# Patient Record
Sex: FEMALE | Race: BLACK OR AFRICAN AMERICAN | NOT HISPANIC OR LATINO | URBAN - METROPOLITAN AREA
[De-identification: names, ages, dates, MRNs, and addresses within clinical notes are randomized per-mention and may not be internally consistent; named-entity substitution may affect disease eponyms.]

---

## 2022-08-17 LAB
EXTERNAL HEPATITIS B SURFACE ANTIGEN: NON REACTIVE
EXTERNAL HIV-1/2 AB-AG: NORMAL
EXTERNAL RUBELLA IGG QUANTITATION: NORMAL
EXTERNAL SYPHILIS TOTAL IGG/IGM SCREENING: NON REACTIVE

## 2023-01-30 ENCOUNTER — OFFICE VISIT (OUTPATIENT)
Dept: OBGYN CLINIC | Age: 33
End: 2023-01-30

## 2023-01-30 VITALS
BODY MASS INDEX: 33.66 KG/M2 | WEIGHT: 202 LBS | SYSTOLIC BLOOD PRESSURE: 124 MMHG | DIASTOLIC BLOOD PRESSURE: 72 MMHG | HEIGHT: 65 IN

## 2023-01-30 DIAGNOSIS — Z34.93 THIRD TRIMESTER PREGNANCY: Primary | ICD-10-CM

## 2023-01-30 NOTE — PROGRESS NOTES
Pt is currently 31wks 5days pregnant   Pt is here to establish OB care with our practice, transfer from [place]   Pt does not have records/did fill out medical records request   No concern at this time    ELKIN 3/29   No LOF,VB  Weimar Miller Contractions   +FM   Blood type O +    No complications w/ prior pregnancy/deliverys

## 2023-01-30 NOTE — PROGRESS NOTES
Assessment/Plan:       Problem List Items Addressed This Visit    None  Visit Diagnoses     Third trimester pregnancy    -  Primary          12GF [id number] at 31 weeks 5 days based on LMP c/w early ultrasound     - Will need full review of patient records to ensure all prenatal labs collected, as well as normal 3hr gtt  - Prenatal care, practice, delivering hospital reviewed    RTO 2 weeks    Subjective:      Patient ID: Steven Fitzgerald is a 27 y o  female  HPI  30yo [id number] at 31w5d by patient stated ELKIN, presenting to [facility] care  No records available for review from care for this pregnancy  OBHx is significant for two term vaginal deliveries  Denies complications - no hemorrhage, blood pressure elevation, infection  Notes large laceration (22 stitches) with first delivery in [place], but unsure of degree of laceration  Unsure about laceration during second delivery  Taking a prenatal vitamin  Was not told that she was anemic in this pregnancy, but does believe that she has Sickle Cell Trait,  tested and negative  O pos per patient, 1hr glucose failed in 3T labs, but she notes 3hr wnl  On review of most recent office note on patient's phone, normal anatomy scan was referenced, no indication for need for further imaging  The following portions of the patient's history were reviewed and updated as appropriate: allergies, current medications, past family history, past medical history, past social history, past surgical history and problem list  Has history of asthma  Review of Systems  no bleeding, LOF  Baby moving well, no contractions  Objective:  /72 (BP Location: Right arm, Patient Position: Sitting, Cuff Size: Large)   Ht 5' 5" (1 651 m)   Wt 91 6 kg (202 lb)   BMI 33 61 kg/m²      Physical Exam  Vitals reviewed  Constitutional:       Appearance: She is well-developed  HENT:      Head: Normocephalic     Cardiovascular:      Rate and Rhythm: Normal rate and regular rhythm  Pulmonary:      Effort: Pulmonary effort is normal    Abdominal:      Palpations: Abdomen is soft  Musculoskeletal:         General: Normal range of motion  Cervical back: Normal range of motion  Skin:     General: Skin is warm and dry  Neurological:      Mental Status: She is alert and oriented to person, place, and time     Psychiatric:         Behavior: Behavior normal        FH and FHR wnl

## 2023-02-15 ENCOUNTER — ROUTINE PRENATAL (OUTPATIENT)
Dept: OBGYN CLINIC | Age: 33
End: 2023-02-15

## 2023-02-15 VITALS
DIASTOLIC BLOOD PRESSURE: 66 MMHG | SYSTOLIC BLOOD PRESSURE: 124 MMHG | WEIGHT: 201.8 LBS | HEIGHT: 65 IN | BODY MASS INDEX: 33.62 KG/M2

## 2023-02-15 DIAGNOSIS — O99.513 ASTHMA AFFECTING PREGNANCY IN THIRD TRIMESTER: ICD-10-CM

## 2023-02-15 DIAGNOSIS — Z3A.34 34 WEEKS GESTATION OF PREGNANCY: Primary | ICD-10-CM

## 2023-02-15 DIAGNOSIS — J45.909 ASTHMA AFFECTING PREGNANCY IN THIRD TRIMESTER: ICD-10-CM

## 2023-02-15 PROBLEM — D56.3 THALASSEMIA ALPHA CARRIER: Status: ACTIVE | Noted: 2023-02-15

## 2023-02-15 NOTE — PROGRESS NOTES
Problem   34 Weeks Gestation of Pregnancy    Late transfer of care from [place]  Blood Type: O positive  Antibody Negative  Pap 2022 NILM/HPV+ -repeat in 1 year   GC/CT -  negative  PN1 Labs- wnl H&H-11  6   28 Week Labs-   COVID vaccine-   Flu vaccine - completed   Blue folder- reviewed  Yellow folder- reviewed    Genetic screening- NIPT negative  AFP- negative   Level 1- 9/15/22 - normal growth  Level 2- 22- normal FG  Needs 32 week scan not done    TDAP - declined  Delivery consent-  Breast pump - ordered  Pediatrician - referral placed    Perineal massage -  GBS swab -   IOL -     Asthma Affecting Pregnancy in Third Trimester   Thalassemia Alpha Carrier     34 weeks gestation of pregnancy  Shannon Martinez is a 27 y o  [id number]  34w0d  who presents for routine PNV  Transfer of care from [place]  She expressed being very frustrated with the lack of communication from her previous OB  OB History        5    Para   2    Term   2       0    AB   2    Living   2       SAB   1    IAB   1    Ectopic   0    Multiple   0    Live Births   2               Patient denies history of pregnancy or delivery complications   x 2 in [place]  Patient inquiring about elective   Advised patient this is not standard practice or recommended by ACOG  Review of OB records:  LMP 22 working ELKIN 3/29/23 consistent with LMP and early [address] on 2022  Completed NT scan on 9/15/22- NIPT and msAFP negative  Patient is a carrier for alpha thalassemia  FOB negative  Prenatal labs wnl  Rubella immune  Pap UTD  GC/CT negative  H/o asthma- well controlled with ventolin inhaler  Anatomy scan 22- normal FG  Recommended 32 week scan was not completed  Referral to MFM placed  1 hr - passed 3 hr GTT  S/p Flu/covid vaccines  Declined tdap,  Reviewed blue and yellow folders  Aware of delivery location at [address] [place] [address]  Feels well overall   Only complaint is heartburn for which she takes OTC famotidine with good relief  Denies CTX/LOF/VB  Perineal massages encouraged  Reviewed PTL and FKCs  Pregnancy Essential guide and Baby and Me web site recommended   RTO in 2 weeks

## 2023-02-15 NOTE — PROGRESS NOTES
Patient is here for prenatal ob visit today  No question's or concerns at this time  Yellow and Blue folders both given and reviewed today  Peds referral given  GA: 2023  Estimated Date of Delivery: 3/29/23    Urine: Protein Negative / Glucose Negative  Denies loss of fluid, vaginal bleeding and contractions  Good fetal movement  Labs are completed and up to date  Up to date with Covid vaccine  Declines Tdap vaccines

## 2023-02-15 NOTE — PATIENT INSTRUCTIONS
Pregnancy at 28 to 38 Weeks   AMBULATORY CARE:   Changes happening with your body: You are considered full term at the beginning of 37 weeks  Your breathing may be easier if your baby has moved down into a head-down position  You may need to urinate more often because the baby may be pressing on your bladder  You may also feel more discomfort and get tired easily  Seek care immediately if:   You develop a severe headache that does not go away  You have new or increased vision changes, such as blurred or spotted vision  You have new or increased swelling in your face or hands  You have vaginal spotting or bleeding  Your water broke or you feel warm water gushing or trickling from your vagina  Call your obstetrician if:   You have more than 5 contractions in 1 hour  You notice any changes in your baby's movements  You have abdominal cramps, pressure, or tightening  You have a change in vaginal discharge  You have chills or a fever  You have vaginal itching, burning, or pain  You have yellow, green, white, or foul-smelling vaginal discharge  You have pain or burning when you urinate, less urine than usual, or pink or bloody urine  You have questions or concerns about your condition or care  How to care for yourself at this stage of your pregnancy:       Eat a variety of healthy foods  Healthy foods include fruits, vegetables, whole-grain breads, low-fat dairy foods, beans, lean meats, and fish  Drink liquids as directed  Ask how much liquid to drink each day and which liquids are best for you  Limit caffeine to less than 200 milligrams each day  Limit your intake of fish to 2 servings each week  Choose fish low in mercury such as canned light tuna, shrimp, salmon, cod, or tilapia  Do not  eat fish high in mercury such as swordfish, tilefish, ritesh mackerel, and shark  Take prenatal vitamins as directed    Your need for certain vitamins and minerals, such as folic acid, increases during pregnancy  Prenatal vitamins provide some of the extra vitamins and minerals you need  Prenatal vitamins may also help to decrease the risk of certain birth defects  Rest as needed  Put your feet up if you have swelling in your ankles and feet  Talk to your healthcare provider about exercise  Moderate exercise can help you stay fit  Your healthcare provider will help you plan an exercise program that is safe for you during pregnancy  Do not smoke  Smoking increases your risk of a miscarriage and other health problems during your pregnancy  Smoking can cause your baby to be born early or weigh less at birth  Ask your healthcare provider for information if you need help quitting  Do not drink alcohol  Alcohol passes from your body to your baby through the placenta  It can affect your baby's brain development and cause fetal alcohol syndrome (FAS)  FAS is a group of conditions that causes mental, behavior, and growth problems  Talk to your healthcare provider before you take any medicines  Many medicines may harm your baby if you take them when you are pregnant  Do not take any medicines, vitamins, herbs, or supplements without first talking to your healthcare provider  Never use illegal or street drugs (such as marijuana or cocaine) while you are pregnant  Safety tips during pregnancy:   Avoid hot tubs and saunas  Do not use a hot tub or sauna while you are pregnant, especially during your first trimester  Hot tubs and saunas may raise your baby's temperature and increase the risk of birth defects  Avoid toxoplasmosis  This is an infection caused by eating raw meat or being around infected cat feces  It can cause birth defects, miscarriages, and other problems  Wash your hands after you touch raw meat  Make sure any meat is well-cooked before you eat it  Avoid raw eggs and unpasteurized milk   Use gloves or ask someone else to clean your cat's litter box while you are pregnant  Ask your healthcare provider about travel  The most comfortable time to travel is during the second trimester  Ask your provider if you can travel after 36 weeks  You may not be able to travel in an airplane after 36 weeks  He or she may also recommend you avoid long road trips  Changes happening with your baby:  By 38 weeks, your baby may weigh between 6 and 9 pounds  Your baby may be about 14 inches long from the top of the head to the rump (baby's bottom)  Your baby hears well enough to know your voice  As your baby gets larger, you may feel fewer kicks and more stretching and rolling  Your baby may move into a head-down position  Your baby will also rest lower in your abdomen  What you need to know about prenatal care: Your healthcare provider will check your blood pressure and weight  You may also need the following:  A urine test  may also be done to check for sugar and protein  These can be signs of gestational diabetes or infection  Protein in your urine may also be a sign of preeclampsia  Preeclampsia is a condition that can develop during week 20 or later of your pregnancy  It causes high blood pressure, and it can cause problems with your kidneys and other organs  A gestational diabetes screen  may be done  Your healthcare provider may order either a 1-step or 2-step oral glucose tolerance test (OGTT)  1-step OGTT:  Your blood sugar level will be tested after you have not eaten for 8 hours (fasting)  You will then be given a glucose drink  Your level will be tested again 1 hour and 2 hours after you finish the drink  2-step OGTT:  You do not have to fast for the first part of the test  You will have the glucose drink at any time of day  Your blood sugar level will be checked 1 hour later  If your blood sugar is higher than a certain level, another test will be ordered  You will fast and your blood sugar level will be tested  You will have the glucose drink  Your blood will be tested again 1 hour, 2 hours, and 3 hours after you finish the glucose drink  A blood test  may be done to check for anemia (low iron level)  A Tdap vaccine  may be recommended by your healthcare provider  A group B strep test  is a test that is done to check for group B strep infection  Group B strep is a type of bacteria that may be found in the vagina or rectum  It can be passed to your baby during delivery if you have it  Your healthcare provider will take swab your vagina or rectum and send the sample to the lab for tests  Fundal height  is a measurement of your uterus to check your baby's growth  This number is usually the same as the number of weeks that you have been pregnant  Your healthcare provider may also check your baby's position  Your baby's heart rate  will be checked  Follow up with your obstetrician as directed:  Write down your questions so you remember to ask them during your visits  © Copyright [address] 2022 Information is for End User's use only and may not be sold, redistributed or otherwise used for commercial purposes  All illustrations and images included in CareNotes® are the copyrighted property of A D A M , Inc  or [address] [place] [address]  The above information is an  only  It is not intended as medical advice for individual conditions or treatments  Talk to your doctor, nurse or pharmacist before following any medical regimen to see if it is safe and effective for you  Valuable Online Resource:    [facility] pregnancy essential guide    http://chandrika diaz/      On the right side of the screen is a 50 page guide providing valuable information about your entire pregnancy      On the left hand side of the site you will see several other links to great information and resources that [facility] offers     If you click on the tab that says "Pregnancy and Birth Packet" this opens another  guide to labor and delivery information as well as breast feeding information,  care, pediatricians, car seat safety and much more     The [facility] and [address] tab has a virtual tour of the new L&D unit, as well as valuable information about classes that are offered, breast feeding support, support groups and much more  I highly recommend the virtual Breast Feeding class, very informational even if you have breast fed in the past  Check for available dates ! Click around and enjoy all we have to offer!     Please note that all information in regards to locations and visiting hours have not been updated due to 2 Theresa Bender delivery location is [address] [place] [address] @ [address], [address] [address]

## 2023-02-15 NOTE — ASSESSMENT & PLAN NOTE
Stacey Talley is a 27 y o  [id number]  34w0d  who presents for routine PNV  Transfer of care from [place]  She expressed being very frustrated with the lack of communication from her previous OB  OB History        5    Para   2    Term   2       0    AB   2    Living   2       SAB   1    IAB   1    Ectopic   0    Multiple   0    Live Births   2               Patient denies history of pregnancy or delivery complications   x 2 in [place]  Patient inquiring about elective   Advised patient this is not standard practice or recommended by ACOG  Review of OB records:  LMP 22 working ELKIN 3/29/23 consistent with LMP and early [address] on 2022  Completed NT scan on 9/15/22- NIPT and msAFP negative  Patient is a carrier for alpha thalassemia  FOB negative  Prenatal labs wnl  Rubella immune  Pap UTD  GC/CT negative  H/o asthma- well controlled with ventolin inhaler  Anatomy scan 22- normal FG  Recommended 32 week scan was not completed  Referral to MFM placed  1 hr - passed 3 hr GTT  S/p Flu/covid vaccines  Declined tdap,  Reviewed blue and yellow folders  Aware of delivery location at [facility]  Feels well overall  Only complaint is heartburn for which she takes OTC famotidine with good relief  Denies CTX/LOF/VB  Perineal massages encouraged  Reviewed PTL and FKCs  Pregnancy Essential guide and Baby and Me web site recommended   RTO in 2 weeks

## 2023-02-16 LAB
DME PARACHUTE DELIVERY DATE REQUESTED: NORMAL
DME PARACHUTE ITEM DESCRIPTION: NORMAL
DME PARACHUTE ORDER STATUS: NORMAL
DME PARACHUTE SUPPLIER NAME: NORMAL
DME PARACHUTE SUPPLIER PHONE: NORMAL

## 2023-02-25 NOTE — PATIENT INSTRUCTIONS
Thank you for choosing us for your  care today  If you have any questions about your ultrasound or care, please do not hesitate to contact us or your primary obstetrician  Some general instructions for your pregnancy are:    Exercise: Aim for 22 minutes per day (150 minutes per week) of regular exercise  Walking is great! Nutrition: Choose healthy sources of calcium, iron, and protein  Learn about Preeclampsia: preeclampsia is a common, serious high blood pressure complication in pregnancy  A blood pressure of 852XDCY (systolic or top number) or 74SZXB (diastolic or bottom number) is not normal and needs evaluation by your doctor  Aspirin is sometimes prescribed in early pregnancy to prevent preeclampsia in women with risk factors - ask your obstetrician if you should be on this medication  For more resources, visit:  Map[employer]age fi  If you smoke, try to reduce how many cigarettes you smoke or try to quit completely  Do not vape  Other warning signs to watch out for in pregnancy or postpartum: chest pain, obstructed breathing or shortness of breath, seizures, thoughts of hurting yourself or your baby, bleeding, a painful or swollen leg, fever, or headache (see [facility] POST-BIRTH Warning Signs campaign)  If these happen call 911  Itching is also not normal in pregnancy and if you experience this, especially over your hands and feet, potentially worse at night, notify your doctors

## 2023-02-27 ENCOUNTER — ROUTINE PRENATAL (OUTPATIENT)
Dept: PERINATAL CARE | Facility: OTHER | Age: 33
End: 2023-02-27

## 2023-02-27 VITALS
HEART RATE: 76 BPM | WEIGHT: 203 LBS | BODY MASS INDEX: 33.82 KG/M2 | HEIGHT: 65 IN | SYSTOLIC BLOOD PRESSURE: 125 MMHG | DIASTOLIC BLOOD PRESSURE: 80 MMHG

## 2023-02-27 DIAGNOSIS — O36.63X0 FETAL MACROSOMIA, THIRD TRIMESTER, NOT APPLICABLE OR UNSPECIFIED FETUS: Primary | ICD-10-CM

## 2023-02-27 DIAGNOSIS — Z36.3 ENCOUNTER FOR ANTENATAL SCREENING FOR MALFORMATIONS: ICD-10-CM

## 2023-02-27 DIAGNOSIS — Z3A.34 34 WEEKS GESTATION OF PREGNANCY: ICD-10-CM

## 2023-02-27 PROBLEM — IMO0001 FETAL MACROSOMIA, THIRD TRIMESTER, NOT APPLICABLE OR UNSPECIFIED FETUS: Status: ACTIVE | Noted: 2023-02-27

## 2023-02-27 LAB
DME PARACHUTE DELIVERY DATE ACTUAL: NORMAL
DME PARACHUTE DELIVERY DATE REQUESTED: NORMAL
DME PARACHUTE ITEM DESCRIPTION: NORMAL
DME PARACHUTE ORDER STATUS: NORMAL
DME PARACHUTE SUPPLIER NAME: NORMAL
DME PARACHUTE SUPPLIER PHONE: NORMAL

## 2023-02-27 NOTE — LETTER
Date: 2023    [address] [facility] [address]    Patient: Rishabh Butts   YOB: 1990   Date of Visit: 2023   Gestational age 27w7d   Thelda Stain of this communication: Routine though please note EFW 90th percentile with no diabetes screening that I can see; please let us know if you want any additional ultrasounds  You are seeing her next 3/1/23  Dear Karmen Santiago,    This patient was seen recently in our  office  Please see ultrasound report under "OB Procedures" tab  Please don't hesitate to contact our office with any concerns or questions        Sincerely,      Delilah Hemphill MD  Attending Physician, [place]

## 2023-02-27 NOTE — PROGRESS NOTES
[address]: Ms Grisel Ashley was seen today for anatomic survey ultrasound  See ultrasound report under "OB Procedures" tab  Review of Systems   Constitutional: Negative for chills, fever and unexpected weight change  HENT: Negative for congestion, dental problem, facial swelling and sore throat  Eyes: Negative for visual disturbance  Respiratory: Negative for cough and shortness of breath  Cardiovascular: Negative for chest pain and palpitations  Gastrointestinal: Negative for diarrhea and vomiting  Endocrine: Negative for polydipsia  Genitourinary: Negative for dysuria and vaginal bleeding  Musculoskeletal: Negative for back pain and joint swelling  Skin: Negative for rash and wound  Allergic/Immunologic: Negative for immunocompromised state  Neurological: Negative for seizures and headaches  Hematological: Does not bruise/bleed easily  Psychiatric/Behavioral: Negative for hallucinations and suicidal ideas  Physical Exam  Constitutional:       General: She is not in acute distress  Appearance: Normal appearance  She is not ill-appearing, toxic-appearing or diaphoretic  HENT:      Head: Normocephalic and atraumatic  Nose: No congestion or rhinorrhea  Eyes:      General: No scleral icterus  Right eye: No discharge  Left eye: No discharge  Extraocular Movements: Extraocular movements intact  Conjunctiva/sclera: Conjunctivae normal    Pulmonary:      Effort: Pulmonary effort is normal  No respiratory distress  Musculoskeletal:      Cervical back: Normal range of motion  Skin:     Coloration: Skin is not jaundiced or pale  Findings: No erythema, lesion or rash  Neurological:      General: No focal deficit present  Mental Status: She is alert and oriented to person, place, and time     Psychiatric:         Mood and Affect: Mood normal          Behavior: Behavior normal          Please don't hesitate to contact our office with any concerns or questions    Anthony Almanzar MD

## 2023-03-01 ENCOUNTER — ROUTINE PRENATAL (OUTPATIENT)
Dept: OBGYN CLINIC | Age: 33
End: 2023-03-01

## 2023-03-01 VITALS
WEIGHT: 202.4 LBS | SYSTOLIC BLOOD PRESSURE: 120 MMHG | HEIGHT: 65 IN | DIASTOLIC BLOOD PRESSURE: 72 MMHG | BODY MASS INDEX: 33.72 KG/M2

## 2023-03-01 DIAGNOSIS — Z3A.36 36 WEEKS GESTATION OF PREGNANCY: ICD-10-CM

## 2023-03-01 DIAGNOSIS — O36.63X0 FETAL MACROSOMIA, THIRD TRIMESTER, NOT APPLICABLE OR UNSPECIFIED FETUS: ICD-10-CM

## 2023-03-01 DIAGNOSIS — Z34.83 PRENATAL CARE, SUBSEQUENT PREGNANCY IN THIRD TRIMESTER: Primary | ICD-10-CM

## 2023-03-01 PROBLEM — E55.9 VITAMIN D DEFICIENCY: Status: ACTIVE | Noted: 2022-08-30

## 2023-03-01 PROBLEM — R87.810 ASCUS WITH POSITIVE HIGH RISK HPV CERVICAL: Status: ACTIVE | Noted: 2020-06-19

## 2023-03-01 PROBLEM — R87.610 ASCUS WITH POSITIVE HIGH RISK HPV CERVICAL: Status: ACTIVE | Noted: 2020-06-19

## 2023-03-01 NOTE — PROGRESS NOTES
Problem   Fetal Macrosomia, Third Trimester, Not Applicable Or Unspecified Fetus   39 Weeks Gestation of Pregnancy    Late transfer of care from [place]  Blood Type: O positive  Antibody Negative  Pap 08/17/2022 NILM/HPV+ -repeat in 1 year   GC/CT -  negative  PN1 Labs- wnl H&H-11 1/35 6   28 Week Labs-   COVID vaccine-   Flu vaccine - completed   Blue folder- reviewed  Yellow folder- reviewed    Genetic screening- NIPT negative  AFP- negative   Level 1- 9/15/22 - normal growth  Level 2- 12/06/22- normal FG  Needs 32 week scan not done    TDAP - declined  Delivery consent-  Breast pump - ordered  Pediatrician - referral placed    Perineal massage - encouraged  GBS swab - collected  IOL -     Vitamin D Deficiency   Ascus With Positive High Risk Hpv Cervical     36 weeks gestation of pregnancy  Charla Pina is a 27 y o  [id number]  36w0d who presents for routine PNV  Denies OB complaints  Good fetal movement  Denies CTX/LOF/VB  GBS collected today  Reviewed FKCs and PTL  RTO in 1 week         Fetal macrosomia, third trimester, not applicable or unspecified fetus  2/27 EFW [place] 4   3218 grams - 7 lbs 1 oz (90%)  Passed 3 hr glucose test  Recommend 39 week IOL

## 2023-03-01 NOTE — PATIENT INSTRUCTIONS
Pregnancy at 28 to 38 Weeks   AMBULATORY CARE:   Changes happening with your body: You are considered full term at the beginning of 37 weeks  Your breathing may be easier if your baby has moved down into a head-down position  You may need to urinate more often because the baby may be pressing on your bladder  You may also feel more discomfort and get tired easily  Seek care immediately if:   You develop a severe headache that does not go away  You have new or increased vision changes, such as blurred or spotted vision  You have new or increased swelling in your face or hands  You have vaginal spotting or bleeding  Your water broke or you feel warm water gushing or trickling from your vagina  Call your obstetrician if:   You have more than 5 contractions in 1 hour  You notice any changes in your baby's movements  You have abdominal cramps, pressure, or tightening  You have a change in vaginal discharge  You have chills or a fever  You have vaginal itching, burning, or pain  You have yellow, green, white, or foul-smelling vaginal discharge  You have pain or burning when you urinate, less urine than usual, or pink or bloody urine  You have questions or concerns about your condition or care  How to care for yourself at this stage of your pregnancy:       Eat a variety of healthy foods  Healthy foods include fruits, vegetables, whole-grain breads, low-fat dairy foods, beans, lean meats, and fish  Drink liquids as directed  Ask how much liquid to drink each day and which liquids are best for you  Limit caffeine to less than 200 milligrams each day  Limit your intake of fish to 2 servings each week  Choose fish low in mercury such as canned light tuna, shrimp, salmon, cod, or tilapia  Do not  eat fish high in mercury such as swordfish, tilefish, ritesh mackerel, and shark  Take prenatal vitamins as directed    Your need for certain vitamins and minerals, such as folic acid, increases during pregnancy  Prenatal vitamins provide some of the extra vitamins and minerals you need  Prenatal vitamins may also help to decrease the risk of certain birth defects  Rest as needed  Put your feet up if you have swelling in your ankles and feet  Talk to your healthcare provider about exercise  Moderate exercise can help you stay fit  Your healthcare provider will help you plan an exercise program that is safe for you during pregnancy  Do not smoke  Smoking increases your risk of a miscarriage and other health problems during your pregnancy  Smoking can cause your baby to be born early or weigh less at birth  Ask your healthcare provider for information if you need help quitting  Do not drink alcohol  Alcohol passes from your body to your baby through the placenta  It can affect your baby's brain development and cause fetal alcohol syndrome (FAS)  FAS is a group of conditions that causes mental, behavior, and growth problems  Talk to your healthcare provider before you take any medicines  Many medicines may harm your baby if you take them when you are pregnant  Do not take any medicines, vitamins, herbs, or supplements without first talking to your healthcare provider  Never use illegal or street drugs (such as marijuana or cocaine) while you are pregnant  Safety tips during pregnancy:   Avoid hot tubs and saunas  Do not use a hot tub or sauna while you are pregnant, especially during your first trimester  Hot tubs and saunas may raise your baby's temperature and increase the risk of birth defects  Avoid toxoplasmosis  This is an infection caused by eating raw meat or being around infected cat feces  It can cause birth defects, miscarriages, and other problems  Wash your hands after you touch raw meat  Make sure any meat is well-cooked before you eat it  Avoid raw eggs and unpasteurized milk   Use gloves or ask someone else to clean your cat's litter box while you are pregnant  Ask your healthcare provider about travel  The most comfortable time to travel is during the second trimester  Ask your provider if you can travel after 36 weeks  You may not be able to travel in an airplane after 36 weeks  He or she may also recommend you avoid long road trips  Changes happening with your baby:  By 38 weeks, your baby may weigh between 6 and 9 pounds  Your baby may be about 14 inches long from the top of the head to the rump (baby's bottom)  Your baby hears well enough to know your voice  As your baby gets larger, you may feel fewer kicks and more stretching and rolling  Your baby may move into a head-down position  Your baby will also rest lower in your abdomen  What you need to know about prenatal care: Your healthcare provider will check your blood pressure and weight  You may also need the following:  A urine test  may also be done to check for sugar and protein  These can be signs of gestational diabetes or infection  Protein in your urine may also be a sign of preeclampsia  Preeclampsia is a condition that can develop during week 20 or later of your pregnancy  It causes high blood pressure, and it can cause problems with your kidneys and other organs  A gestational diabetes screen  may be done  Your healthcare provider may order either a 1-step or 2-step oral glucose tolerance test (OGTT)  1-step OGTT:  Your blood sugar level will be tested after you have not eaten for 8 hours (fasting)  You will then be given a glucose drink  Your level will be tested again 1 hour and 2 hours after you finish the drink  2-step OGTT:  You do not have to fast for the first part of the test  You will have the glucose drink at any time of day  Your blood sugar level will be checked 1 hour later  If your blood sugar is higher than a certain level, another test will be ordered  You will fast and your blood sugar level will be tested  You will have the glucose drink  Your blood will be tested again 1 hour, 2 hours, and 3 hours after you finish the glucose drink  A blood test  may be done to check for anemia (low iron level)  A Tdap vaccine  may be recommended by your healthcare provider  A group B strep test  is a test that is done to check for group B strep infection  Group B strep is a type of bacteria that may be found in the vagina or rectum  It can be passed to your baby during delivery if you have it  Your healthcare provider will take swab your vagina or rectum and send the sample to the lab for tests  Fundal height  is a measurement of your uterus to check your baby's growth  This number is usually the same as the number of weeks that you have been pregnant  Your healthcare provider may also check your baby's position  Your baby's heart rate  will be checked  Follow up with your obstetrician as directed:  Write down your questions so you remember to ask them during your visits  © Copyright Zack Abdalla  Information is for End User's use only and may not be sold, redistributed or otherwise used for commercial purposes  The above information is an  only  It is not intended as medical advice for individual conditions or treatments  Talk to your doctor, nurse or pharmacist before following any medical regimen to see if it is safe and effective for you   Labor   AMBULATORY CARE:    (premature) labor  occurs when the uterus contracts and your cervix opens earlier than normal  The cervix is the opening of your uterus   labor happens after the 20th week of pregnancy but before the 37th week  You may have premature rupture of membranes (PROM)  PROM means your water broke before labor began  An early labor could cause you to have your baby before he or she is ready to be born  Common signs and symptoms include the following: You may not know that you are having  labor   It is common to have  contractions (tightening and relaxing of the uterus) and not notice them  The following are signs and symptoms that suggest a  labor:  Contractions that get stronger and closer together    Changes in vaginal discharge, such as more discharge or discharge that is watery or bloody     Low back pain     Pressure in the lower abdomen     Vaginal spotting or bleeding    Call your local emergency number (911 in the [address]) if:   You see or feel like there is something in your vagina  Call your doctor if:   You have bright red, painless vaginal bleeding  Your symptoms do not get better or they get worse  Your water broke or you feel warm water gushing or trickling from your vagina  You have contractions that get stronger and closer together for more than 1 hour  You notice a decrease in your baby's movement  You have abdominal cramps, pressure, or tightening  You have a change in vaginal discharge  You have a fever  You have burning when you urinate or you are urinating less than is normal for you  You have questions or concerns about your condition or care  How  labor is diagnosed: You may have one or more of the following tests to check for  labor:  A pelvic exam  is also called an internal or vaginal exam  During a pelvic exam, your healthcare provider will gently put a warmed speculum into your vagina  A speculum is a tool that opens your vagina  This lets your healthcare provider see if your cervix is opening  A vaginal ultrasound  uses sound waves to show pictures of your cervix and your baby inside your uterus  During this test, a small tube is placed into your vagina  This test will help your healthcare provider see if your cervix is opening  A fetal ultrasound  uses sound waves to show pictures of your baby inside your uterus  The movement, heart rate, and position of your baby can also be seen           A fetal fibronectin test  checks for a protein called fetal fibronectin in the cervix or vagina  Normally, there is no protein in cervical and vaginal secretions until the 20th week of pregnancy up to the end of pregnancy  Blood and urine tests  may be done to look for signs of infection  Treatment for  labor  may delay delivery  You may need any of the following:  Bed rest  may be recommended  You may need to lie on your left side, which improves circulation to the uterus and baby  Your healthcare provider will tell you when it is okay to get out of bed  Medicine  may be given to stop contractions if your baby is not ready to be born  You may also need certain medicines if your  labor cannot be stopped and your healthcare provider thinks you will have your baby early  These medicines help your baby's lungs, brain, and digestive organs mature  They also help decrease your baby's risk of being born with cerebral palsy  If you have PROM, fluid from your vagina or rectum will be checked for a strep infection  You may be given antibiotics to prevent a strep infection during delivery  Antibiotics may also be used to prevent labor from starting  You may also need steroids to decrease the risk for complications due to  labor  Self-care:   Rest  as much as possible  You may need to lie on your left side to improve circulation to the uterus and baby  You may be able to prevent  labor by resting and reducing your physical activity  Ask your healthcare provider about activities that are safe for you to do  Your healthcare provider or obstetrician may recommend that you avoid sexual intercourse  Ask your healthcare provider if exercise is safe  Drink liquids as directed  Ask how much liquid to drink each day and which liquids are best for you  Do not smoke  Your baby may not grow well and he or she may weigh less at birth if you smoke during pregnancy  Smoking also increases the risk that your baby will be born too early   Nicotine and other chemicals in cigarettes and cigars can cause lung damage  Ask your healthcare provider for information if you currently smoke and need help to quit  E-cigarettes or smokeless tobacco still contain nicotine  Talk to your healthcare provider before you use these products  Do not drink alcohol  Alcohol may harm your unborn baby and cause  labor  Maintain a healthy weight  A healthy weight may prevent  labor  Ask your healthcare provider how much weight you should gain during your pregnancy  Follow up with your doctor as directed:  Write down your questions so you remember to ask them during your visits  ©  Reema Rubio  Information is for End User's use only and may not be sold, redistributed or otherwise used for commercial purposes  The above information is an  only  It is not intended as medical advice for individual conditions or treatments  Talk to your doctor, nurse or pharmacist before following any medical regimen to see if it is safe and effective for you

## 2023-03-01 NOTE — PROGRESS NOTES
Patient presents for a routine prenatal visit    36W0D  Good Fetal Movement  No LOF,bleeding, or discharge  Has a lot of cramping  No current complaints at this time       Declined Tdap vaccine, UTD on flu vaccine    Urine: unable to void

## 2023-03-01 NOTE — ASSESSMENT & PLAN NOTE
Valentino Cashra is a 27 y o  [id number]  36w0d who presents for routine PNV  Denies OB complaints  Good fetal movement  Denies CTX/LOF/VB  GBS collected today  Reviewed FKCs and PTL  RTO in 1 week

## 2023-03-03 LAB — GP B STREP DNA SPEC QL NAA+PROBE: NEGATIVE

## 2023-03-06 NOTE — ASSESSMENT & PLAN NOTE
She feels well other than vaginal irritation  Yeast on exam  No vaginal pooling  Vasalva negative  Ph 4 5  She was advised to use Monistat 7 intravaginally for 7 nights  She denies CTX/VB  She voiced no other concerns  Discussed fetal kick counting  She was encouraged to START with her perineal/vaginal massages to prevent lacerations during the labor process

## 2023-03-06 NOTE — PATIENT INSTRUCTIONS
Pregnancy at 28 to 38 Weeks   AMBULATORY CARE:   Changes happening with your body: You are considered full term at the beginning of 37 weeks  Your breathing may be easier if your baby has moved down into a head-down position  You may need to urinate more often because the baby may be pressing on your bladder  You may also feel more discomfort and get tired easily  Seek care immediately if:   You develop a severe headache that does not go away  You have new or increased vision changes, such as blurred or spotted vision  You have new or increased swelling in your face or hands  You have vaginal spotting or bleeding  Your water broke or you feel warm water gushing or trickling from your vagina  Call your obstetrician if:   You have more than 5 contractions in 1 hour  You notice any changes in your baby's movements  You have abdominal cramps, pressure, or tightening  You have a change in vaginal discharge  You have chills or a fever  You have vaginal itching, burning, or pain  You have yellow, green, white, or foul-smelling vaginal discharge  You have pain or burning when you urinate, less urine than usual, or pink or bloody urine  You have questions or concerns about your condition or care  How to care for yourself at this stage of your pregnancy:       Eat a variety of healthy foods  Healthy foods include fruits, vegetables, whole-grain breads, low-fat dairy foods, beans, lean meats, and fish  Drink liquids as directed  Ask how much liquid to drink each day and which liquids are best for you  Limit caffeine to less than 200 milligrams each day  Limit your intake of fish to 2 servings each week  Choose fish low in mercury such as canned light tuna, shrimp, salmon, cod, or tilapia  Do not  eat fish high in mercury such as swordfish, tilefish, ritesh mackerel, and shark  Take prenatal vitamins as directed    Your need for certain vitamins and minerals, such as folic acid, increases during pregnancy  Prenatal vitamins provide some of the extra vitamins and minerals you need  Prenatal vitamins may also help to decrease the risk of certain birth defects  Rest as needed  Put your feet up if you have swelling in your ankles and feet  Talk to your healthcare provider about exercise  Moderate exercise can help you stay fit  Your healthcare provider will help you plan an exercise program that is safe for you during pregnancy  Do not smoke  Smoking increases your risk of a miscarriage and other health problems during your pregnancy  Smoking can cause your baby to be born early or weigh less at birth  Ask your healthcare provider for information if you need help quitting  Do not drink alcohol  Alcohol passes from your body to your baby through the placenta  It can affect your baby's brain development and cause fetal alcohol syndrome (FAS)  FAS is a group of conditions that causes mental, behavior, and growth problems  Talk to your healthcare provider before you take any medicines  Many medicines may harm your baby if you take them when you are pregnant  Do not take any medicines, vitamins, herbs, or supplements without first talking to your healthcare provider  Never use illegal or street drugs (such as marijuana or cocaine) while you are pregnant  Safety tips during pregnancy:   Avoid hot tubs and saunas  Do not use a hot tub or sauna while you are pregnant, especially during your first trimester  Hot tubs and saunas may raise your baby's temperature and increase the risk of birth defects  Avoid toxoplasmosis  This is an infection caused by eating raw meat or being around infected cat feces  It can cause birth defects, miscarriages, and other problems  Wash your hands after you touch raw meat  Make sure any meat is well-cooked before you eat it  Avoid raw eggs and unpasteurized milk   Use gloves or ask someone else to clean your cat's litter box while you are pregnant  Ask your healthcare provider about travel  The most comfortable time to travel is during the second trimester  Ask your provider if you can travel after 36 weeks  You may not be able to travel in an airplane after 36 weeks  He or she may also recommend you avoid long road trips  Changes happening with your baby:  By 38 weeks, your baby may weigh between 6 and 9 pounds  Your baby may be about 14 inches long from the top of the head to the rump (baby's bottom)  Your baby hears well enough to know your voice  As your baby gets larger, you may feel fewer kicks and more stretching and rolling  Your baby may move into a head-down position  Your baby will also rest lower in your abdomen  What you need to know about prenatal care: Your healthcare provider will check your blood pressure and weight  You may also need the following:  A urine test  may also be done to check for sugar and protein  These can be signs of gestational diabetes or infection  Protein in your urine may also be a sign of preeclampsia  Preeclampsia is a condition that can develop during week 20 or later of your pregnancy  It causes high blood pressure, and it can cause problems with your kidneys and other organs  A gestational diabetes screen  may be done  Your healthcare provider may order either a 1-step or 2-step oral glucose tolerance test (OGTT)  1-step OGTT:  Your blood sugar level will be tested after you have not eaten for 8 hours (fasting)  You will then be given a glucose drink  Your level will be tested again 1 hour and 2 hours after you finish the drink  2-step OGTT:  You do not have to fast for the first part of the test  You will have the glucose drink at any time of day  Your blood sugar level will be checked 1 hour later  If your blood sugar is higher than a certain level, another test will be ordered  You will fast and your blood sugar level will be tested  You will have the glucose drink  Your blood will be tested again 1 hour, 2 hours, and 3 hours after you finish the glucose drink  A blood test  may be done to check for anemia (low iron level)  A Tdap vaccine  may be recommended by your healthcare provider  A group B strep test  is a test that is done to check for group B strep infection  Group B strep is a type of bacteria that may be found in the vagina or rectum  It can be passed to your baby during delivery if you have it  Your healthcare provider will take swab your vagina or rectum and send the sample to the lab for tests  Fundal height  is a measurement of your uterus to check your baby's growth  This number is usually the same as the number of weeks that you have been pregnant  Your healthcare provider may also check your baby's position  Your baby's heart rate  will be checked  Follow up with your obstetrician as directed:  Write down your questions so you remember to ask them during your visits  © Copyright Omelia Passy 2022 Information is for End User's use only and may not be sold, redistributed or otherwise used for commercial purposes  The above information is an  only  It is not intended as medical advice for individual conditions or treatments  Talk to your doctor, nurse or pharmacist before following any medical regimen to see if it is safe and effective for you

## 2023-03-07 ENCOUNTER — ROUTINE PRENATAL (OUTPATIENT)
Dept: OBGYN CLINIC | Age: 33
End: 2023-03-07

## 2023-03-07 VITALS
SYSTOLIC BLOOD PRESSURE: 118 MMHG | WEIGHT: 201 LBS | HEART RATE: 86 BPM | DIASTOLIC BLOOD PRESSURE: 80 MMHG | BODY MASS INDEX: 33.45 KG/M2

## 2023-03-07 DIAGNOSIS — Z3A.36 36 WEEKS GESTATION OF PREGNANCY: ICD-10-CM

## 2023-03-07 DIAGNOSIS — O99.513 ASTHMA AFFECTING PREGNANCY IN THIRD TRIMESTER: ICD-10-CM

## 2023-03-07 DIAGNOSIS — Z34.83 PRENATAL CARE, SUBSEQUENT PREGNANCY IN THIRD TRIMESTER: Primary | ICD-10-CM

## 2023-03-07 DIAGNOSIS — D56.3 THALASSEMIA ALPHA CARRIER: ICD-10-CM

## 2023-03-07 DIAGNOSIS — O36.63X0 FETAL MACROSOMIA, THIRD TRIMESTER, NOT APPLICABLE OR UNSPECIFIED FETUS: ICD-10-CM

## 2023-03-07 DIAGNOSIS — J45.909 ASTHMA AFFECTING PREGNANCY IN THIRD TRIMESTER: ICD-10-CM

## 2023-03-07 DIAGNOSIS — B37.49 CANDIDA INFECTION OF GENITAL REGION: ICD-10-CM

## 2023-03-07 NOTE — PROGRESS NOTES
Pt is feeling fetal movement no LOF or vaginal bleeding   Vaginal itching thinks she leaking fluid not sure

## 2023-03-07 NOTE — PROGRESS NOTES
Problem   36 Weeks Gestation of Pregnancy    Late transfer of care from [place]  Blood Type: O positive  Antibody Negative  Pap 08/17/2022 NILM/HPV+ -repeat in 1 year   GC/CT -  negative  PN1 Labs- wnl H&H-11 1/35 6   28 Week Labs-   COVID vaccine-   Flu vaccine - completed   Blue folder- reviewed  Yellow folder- reviewed    Genetic screening- NIPT negative  AFP- negative   Level 1- 9/15/22 - normal growth  Level 2- 12/06/22- normal FG  Possible macrosomia on FG 35 wk u/s  EFW 90%  TDAP - declined  Delivery consent-  Breast pump - ordered  Pediatrician - referral placed    Perineal massage - encouraged  GBS swab - negative  IOL -       36 weeks gestation of pregnancy  She feels well other than vaginal irritation  Yeast on exam  No vaginal pooling  Vasalva negative  Ph 4 5  She was advised to use Monistat 7 intravaginally for 7 nights  She denies CTX/VB  She voiced no other concerns  Discussed fetal kick counting  She was encouraged to START with her perineal/vaginal massages to prevent lacerations during the labor process

## 2023-03-14 ENCOUNTER — TELEPHONE (OUTPATIENT)
Dept: OBGYN CLINIC | Facility: CLINIC | Age: 33
End: 2023-03-14

## 2023-03-14 ENCOUNTER — ROUTINE PRENATAL (OUTPATIENT)
Dept: OBGYN CLINIC | Age: 33
End: 2023-03-14

## 2023-03-14 VITALS
BODY MASS INDEX: 33.66 KG/M2 | DIASTOLIC BLOOD PRESSURE: 70 MMHG | WEIGHT: 202 LBS | HEIGHT: 65 IN | SYSTOLIC BLOOD PRESSURE: 120 MMHG

## 2023-03-14 DIAGNOSIS — Z3A.37 37 WEEKS GESTATION OF PREGNANCY: ICD-10-CM

## 2023-03-14 DIAGNOSIS — Z34.83 ENCOUNTER FOR SUPERVISION OF OTHER NORMAL PREGNANCY, THIRD TRIMESTER: Primary | ICD-10-CM

## 2023-03-14 LAB
SL AMB  POCT GLUCOSE, UA: NEGATIVE
SL AMB POCT URINE PROTEIN: NEGATIVE

## 2023-03-14 NOTE — PROGRESS NOTES
27 y o  [id number] at 37w6d, here for routine OB visit  Feeling tired and wants to be done with pregnancy  Good FM  Denies LOF, VB, labor contractions  Hoping to schedule IOL  Problem List Items Addressed This Visit        Other    37 weeks gestation of pregnancy     -precautions reviewed  -delivery and IOL consent signed   Desires IOL, will need ripening        Other Visit Diagnoses     Encounter for supervision of other normal pregnancy, third trimester    -  Primary    Relevant Orders    POCT urine dip

## 2023-03-14 NOTE — PROGRESS NOTES
Pt is here for routine ob visit   No concerns at this time  Urine neg/neg   No LOF,VB  Contractions and pressure   +FM   UTD flu   Declined tdap  GBS negative   Delivery Consent signed today

## 2023-03-14 NOTE — TELEPHONE ENCOUNTER
----- Message from Franklyn Escobar MD sent at 3/14/2023  1:47 PM EDT -----  Regarding: Scheduling  Procedure to be scheduled: IOL  ELKIN: Estimated Date of Delivery: Estimated Date of Delivery: 3/29/23  Indication for delivery: elective  Requested date (s) of delivery: 3/22/23         If requested date is unavailable, is there a date by which the pt must be delivered?  4/5/23  Physician preference: no     If IOL, anticipated method: ripening

## 2023-03-15 NOTE — TELEPHONE ENCOUNTER
Pt is calling today about her induction and she also would like the name of a pediatrician in her area Gerard Anton please

## 2023-03-16 NOTE — TELEPHONE ENCOUNTER
Called pt- informed we will call her tomorrow, Friday, with an IOL date   Advised pt  To refer to list of pediatricians in yellow folder, reach out to her PCP, or friends/relatives for potential pediatrician referrals  Pt verbalized understanding

## 2023-03-16 NOTE — TELEPHONE ENCOUNTER
Patient called back today waiting to hear about induction  Patient also has another question regarding a pediatrician in [facility]

## 2023-03-17 NOTE — TELEPHONE ENCOUNTER
Pt called asking about her IOL date  Informed pt, per L & D, no availability for next week  , unable to schedule > 7 days in advance, per L [facility] policy  Informed pt that we will continue to ck with L & [facility] & will contact her Monday

## 2023-03-21 ENCOUNTER — ROUTINE PRENATAL (OUTPATIENT)
Dept: OBGYN CLINIC | Age: 33
End: 2023-03-21

## 2023-03-21 VITALS
WEIGHT: 201 LBS | DIASTOLIC BLOOD PRESSURE: 80 MMHG | SYSTOLIC BLOOD PRESSURE: 138 MMHG | HEIGHT: 65 IN | BODY MASS INDEX: 33.49 KG/M2

## 2023-03-21 DIAGNOSIS — Z3A.39 39 WEEKS GESTATION OF PREGNANCY: Primary | ICD-10-CM

## 2023-03-21 LAB
SL AMB  POCT GLUCOSE, UA: NEGATIVE
SL AMB POCT URINE PROTEIN: NEGATIVE

## 2023-03-21 NOTE — PROGRESS NOTES
27 y o  [id number] at 38w6d, here for routine OB visit  Feeling frustrated that she is still pregnant  Had been hoping for an elective induction: angry that she has not yet been scheduled  Good FM  Denies LOF, VB, contractions  Walking lots  Problem   39 Weeks Gestation of Pregnancy    Late transfer of care from [place]  Blood Type: O positive  Antibody Negative  Pap 08/17/2022 NILM/HPV+ -repeat in 1 year   GC/CT -  negative  PN1 Labs- wnl H&H-11 1/35 6   S/p Flu vaccine   TDAP - declined  Delivery consent - signed  Breast pump - ordered  GBS - negative  IOL - scheduling pending  Consent previously signed   Reviewed process and elective nature of induction until 41st week         Problem List Items Addressed This Visit        Other    39 weeks gestation of pregnancy - Primary    Relevant Orders    POCT urine dip (Completed)

## 2023-03-21 NOTE — PROGRESS NOTES
Pt is here for routine ob visit   No concerns at this time  Urine neg/neg   No LOF,VB,Contractions  +FM   UTD flu  Declined tdap  GBS negative   Delivery consent signed at previous visit

## 2023-03-22 ENCOUNTER — TELEPHONE (OUTPATIENT)
Dept: OBGYN CLINIC | Facility: CLINIC | Age: 33
End: 2023-03-22

## 2023-03-27 ENCOUNTER — HOSPITAL ENCOUNTER (OUTPATIENT)
Facility: HOSPITAL | Age: 33
Discharge: HOME | End: 2023-03-27
Attending: STUDENT IN AN ORGANIZED HEALTH CARE EDUCATION/TRAINING PROGRAM | Admitting: STUDENT IN AN ORGANIZED HEALTH CARE EDUCATION/TRAINING PROGRAM

## 2023-03-27 VITALS
WEIGHT: 201 LBS | SYSTOLIC BLOOD PRESSURE: 107 MMHG | TEMPERATURE: 98.4 F | HEIGHT: 65 IN | HEART RATE: 74 BPM | RESPIRATION RATE: 18 BRPM | OXYGEN SATURATION: 98 % | BODY MASS INDEX: 33.49 KG/M2 | DIASTOLIC BLOOD PRESSURE: 63 MMHG

## 2023-03-27 LAB
ABO GROUP BLD: NORMAL
ABO GROUP BLD: NORMAL
ALBUMIN SERPL BCP-MCNC: 3.4 G/DL (ref 3.5–5)
ALP SERPL-CCNC: 243 U/L (ref 34–104)
ALT SERPL W P-5'-P-CCNC: 8 U/L (ref 7–52)
ANION GAP SERPL CALCULATED.3IONS-SCNC: 7 MMOL/L (ref 4–13)
AST SERPL W P-5'-P-CCNC: 11 U/L (ref 13–39)
BILIRUB SERPL-MCNC: 0.32 MG/DL (ref 0.2–1)
BLD GP AB SCN SERPL QL: NEGATIVE
BUN SERPL-MCNC: 9 MG/DL (ref 5–25)
CALCIUM ALBUM COR SERPL-MCNC: 9.3 MG/DL (ref 8.3–10.1)
CALCIUM SERPL-MCNC: 8.8 MG/DL (ref 8.4–10.2)
CHLORIDE SERPL-SCNC: 106 MMOL/L (ref 96–108)
CO2 SERPL-SCNC: 22 MMOL/L (ref 21–32)
CREAT SERPL-MCNC: 0.58 MG/DL (ref 0.6–1.3)
CREAT UR-MCNC: 84.4 MG/DL
ERYTHROCYTE [DISTWIDTH] IN BLOOD BY AUTOMATED COUNT: 16.6 % (ref 11.6–15.1)
GFR SERPL CREATININE-BSD FRML MDRD: 120 ML/MIN/1.73SQ M
GLUCOSE SERPL-MCNC: 107 MG/DL (ref 65–140)
HCT VFR BLD AUTO: 35.6 % (ref 34.8–46.1)
HGB BLD-MCNC: 10.9 G/DL (ref 11.5–15.4)
MCH RBC QN AUTO: 23.4 PG (ref 26.8–34.3)
MCHC RBC AUTO-ENTMCNC: 30.6 G/DL (ref 31.4–37.4)
MCV RBC AUTO: 77 FL (ref 82–98)
PLATELET # BLD AUTO: 118 THOUSANDS/UL (ref 149–390)
POTASSIUM SERPL-SCNC: 3.6 MMOL/L (ref 3.5–5.3)
PROT SERPL-MCNC: 6.1 G/DL (ref 6.4–8.4)
PROT UR-MCNC: 16 MG/DL
PROT/CREAT UR: 0.19 MG/G{CREAT} (ref 0–0.1)
RBC # BLD AUTO: 4.65 MILLION/UL (ref 3.81–5.12)
RH BLD: POSITIVE
RH BLD: POSITIVE
SODIUM SERPL-SCNC: 135 MMOL/L (ref 135–147)
SPECIMEN EXPIRATION DATE: NORMAL
TREPONEMA PALLIDUM IGG+IGM AB [PRESENCE] IN SERUM OR PLASMA BY IMMUNOASSAY: NORMAL
WBC # BLD AUTO: 8.97 THOUSAND/UL (ref 4.31–10.16)

## 2023-03-27 NOTE — PROGRESS NOTES
L&D Triage Note - OB/GYN  Heidy Ho 27 y o  female MRN: [id number]  Unit/Bed#: LD TRIAGE 4-01 Encounter: [id number]      ASSESSMENT:    Heidy Ho is a 27 y o  [id number] at 39w5d who presents for r/o labor  PLAN:    1) r/o labor  SVE 1 5/70/-4, unchanged after 2hr recheck  FHT reactive  Jal: occasional contractions  DVP 3 39cm    2) elevated BP  152/82 in the ED  Rest of BPS in triage 107-135/63-85  CBC: Plts 119  CMP wnl  PCr 0 19    3) Continue routine prenatal care  4) Discharge from [facility] triage with term labor precautions    - Reviewed rupture of membranes, false vs true labor, decreased fetal movement, and vaginal bleeding   - Pt to call provider with any concerns and follow up at her next scheduled prenatal appointment 3/28/23   - Case discussed with Dr Erich Westfall:    Heidy Ho 27 y o  [id number] at 39w5d with an Estimated Date of Delivery: 3/29/23 who presents with contractions  She reports rectal pressure and that she has been anabel throughout the day and they have become closer together  She also notices brown spotting  She denies LOF, and endorses good fetal movement  She denies headache, scotoma, RUQ, and SOB      Her obstetrical history is significant for two prior term vaginal deliveries    Contractions: yes  Leakage of fluid: no  Vaginal Bleeding: brown spotting  Fetal movement: present    OBJECTIVE:    Vitals:    03/27/23 0300   BP: 116/57   Pulse:    Resp:    Temp:    SpO2:        ROS:  Constitutional: Negative for fevers, chills, headaches, vision changes  Respiratory: Negative for shortness of breath, cough  Cardiovascular: Negative for chest pain, palpitations, lower extremity edema    Gastrointestinal: Negative for nausea, vomiting, diarrhea, constipation  :  Negative for dysuria, hematuria  EXTR:  Negative for rash, new myalgias/arthralgias, joint swelling    General Physical Exam:  General: in no apparent distress and well developed and well nourished  Cardiovascular: Cor RRR  Lungs: non-labored breathing  Abdomen: abdomen is soft without significant tenderness, masses, organomegaly or guarding  Lower extremeties: nontender    Cervical Exam  SVE: 1 5 / 70% / -4    Fetal monitoring:  FHT:  135 bpm/ Moderate 6 - 25 bpm / 15 x 15 accelerations present, no decelerations  Hadley: contractions occasionally     Imaging:      Abd  US   DVP: 3 39cm   Presentation: cephalic    Stephanie Kaye MD,  OBGYN PGY-1  3/27/2023 3:07 AM

## 2023-03-27 NOTE — PROCEDURES
Marci Ashley, a [id number] at 39w5d with an ELKIN of 3/29/2023, by Last Menstrual Period, was seen at [address] for the following procedure(s): $Procedure Type: CORINA]         4 Quadrant CORINA  LVP (cm): 3 4 cm                           Earnest Gasca MD  Obstetrics & Gynecology, PGY1

## 2023-03-27 NOTE — PROGRESS NOTES
Pt is here for routine ob visit   + contractions every 8 minutes with vaginal bleeding  No LOF   +FM   UTD flu shot   Declined tdap  GBS negative   Delivery Consent signed at previous visit   Induction scheduled for this evening

## 2023-03-28 ENCOUNTER — ANESTHESIA (INPATIENT)
Dept: LABOR AND DELIVERY | Facility: HOSPITAL | Age: 33
End: 2023-03-28

## 2023-03-28 ENCOUNTER — ANESTHESIA EVENT (INPATIENT)
Dept: LABOR AND DELIVERY | Facility: HOSPITAL | Age: 33
End: 2023-03-28

## 2023-03-28 ENCOUNTER — ROUTINE PRENATAL (OUTPATIENT)
Dept: OBGYN CLINIC | Age: 33
End: 2023-03-28

## 2023-03-28 ENCOUNTER — HOSPITAL ENCOUNTER (INPATIENT)
Facility: HOSPITAL | Age: 33
LOS: 3 days | Discharge: HOME | End: 2023-03-31
Attending: STUDENT IN AN ORGANIZED HEALTH CARE EDUCATION/TRAINING PROGRAM | Admitting: STUDENT IN AN ORGANIZED HEALTH CARE EDUCATION/TRAINING PROGRAM

## 2023-03-28 ENCOUNTER — APPOINTMENT (OUTPATIENT)
Dept: LABOR AND DELIVERY | Facility: HOSPITAL | Age: 33
End: 2023-03-28

## 2023-03-28 VITALS
BODY MASS INDEX: 33.49 KG/M2 | HEIGHT: 65 IN | SYSTOLIC BLOOD PRESSURE: 118 MMHG | DIASTOLIC BLOOD PRESSURE: 84 MMHG | WEIGHT: 201 LBS

## 2023-03-28 DIAGNOSIS — Z3A.39 39 WEEKS GESTATION OF PREGNANCY: Primary | ICD-10-CM

## 2023-03-28 PROBLEM — R03.0 ELEVATED BLOOD-PRESSURE READING WITHOUT DIAGNOSIS OF HYPERTENSION: Status: ACTIVE | Noted: 2023-03-28

## 2023-03-28 PROBLEM — O13.9 GESTATIONAL HYPERTENSION: Status: ACTIVE | Noted: 2023-03-28

## 2023-03-28 PROBLEM — O99.119 THROMBOCYTOPENIA AFFECTING PREGNANCY (HCC): Status: ACTIVE | Noted: 2023-03-28

## 2023-03-28 PROBLEM — D69.6 THROMBOCYTOPENIA AFFECTING PREGNANCY (HCC): Status: ACTIVE | Noted: 2023-03-28

## 2023-03-28 LAB
ABO GROUP BLD: NORMAL
ALBUMIN SERPL BCP-MCNC: 3.5 G/DL (ref 3.5–5)
ALP SERPL-CCNC: 253 U/L (ref 34–104)
ALT SERPL W P-5'-P-CCNC: 8 U/L (ref 7–52)
ANION GAP SERPL CALCULATED.3IONS-SCNC: 9 MMOL/L (ref 4–13)
ANISOCYTOSIS BLD QL SMEAR: PRESENT
AST SERPL W P-5'-P-CCNC: 13 U/L (ref 13–39)
BASE EXCESS BLDCOA CALC-SCNC: -6.5 MMOL/L (ref 3–11)
BASE EXCESS BLDCOV CALC-SCNC: -4.6 MMOL/L (ref 1–9)
BASOPHILS # BLD MANUAL: 0 THOUSAND/UL (ref 0–0.1)
BASOPHILS NFR MAR MANUAL: 0 % (ref 0–1)
BILIRUB SERPL-MCNC: 0.42 MG/DL (ref 0.2–1)
BLD GP AB SCN SERPL QL: NEGATIVE
BUN SERPL-MCNC: 8 MG/DL (ref 5–25)
CALCIUM SERPL-MCNC: 9 MG/DL (ref 8.4–10.2)
CHLORIDE SERPL-SCNC: 106 MMOL/L (ref 96–108)
CO2 SERPL-SCNC: 20 MMOL/L (ref 21–32)
CREAT SERPL-MCNC: 0.52 MG/DL (ref 0.6–1.3)
CREAT UR-MCNC: 51.4 MG/DL
EOSINOPHIL # BLD MANUAL: 0.11 THOUSAND/UL (ref 0–0.4)
EOSINOPHIL NFR BLD MANUAL: 1 % (ref 0–6)
ERYTHROCYTE [DISTWIDTH] IN BLOOD BY AUTOMATED COUNT: 16.7 % (ref 11.6–15.1)
GFR SERPL CREATININE-BSD FRML MDRD: 125 ML/MIN/1.73SQ M
GIANT PLATELETS BLD QL SMEAR: PRESENT
GLUCOSE SERPL-MCNC: 86 MG/DL (ref 65–140)
HCO3 BLDCOA-SCNC: 23.4 MMOL/L (ref 17.3–27.3)
HCO3 BLDCOV-SCNC: 21.1 MMOL/L (ref 12.2–28.6)
HCT VFR BLD AUTO: 36 % (ref 34.8–46.1)
HGB BLD-MCNC: 11.1 G/DL (ref 11.5–15.4)
HOLD SPECIMEN: NORMAL
LG PLATELETS BLD QL SMEAR: PRESENT
LYMPHOCYTES # BLD AUTO: 1.52 THOUSAND/UL (ref 0.6–4.47)
LYMPHOCYTES # BLD AUTO: 14 % (ref 14–44)
MCH RBC QN AUTO: 23.2 PG (ref 26.8–34.3)
MCHC RBC AUTO-ENTMCNC: 30.8 G/DL (ref 31.4–37.4)
MCV RBC AUTO: 75 FL (ref 82–98)
MONOCYTES # BLD AUTO: 0.44 THOUSAND/UL (ref 0–1.22)
MONOCYTES NFR BLD: 4 % (ref 4–12)
NEUTROPHILS # BLD MANUAL: 8.82 THOUSAND/UL (ref 1.85–7.62)
NEUTS SEG NFR BLD AUTO: 81 % (ref 43–75)
O2 CT VFR BLDCOA CALC: 2.7 ML/DL
OXYHGB MFR BLDCOA: 12.1 %
OXYHGB MFR BLDCOV: 43.1 %
PCO2 BLDCOA: 65.9 MM[HG] (ref 30–60)
PCO2 BLDCOV: 41.5 MM HG (ref 27–43)
PH BLDCOA: 7.17 [PH] (ref 7.23–7.43)
PH BLDCOV: 7.33 [PH] (ref 7.19–7.49)
PLATELET # BLD AUTO: 114 THOUSANDS/UL (ref 149–390)
PLATELET BLD QL SMEAR: ABNORMAL
PO2 BLDCOA: 10.9 MM HG (ref 5–25)
PO2 BLDCOV: 19.5 MM HG (ref 15–45)
POTASSIUM SERPL-SCNC: 3.7 MMOL/L (ref 3.5–5.3)
PROT SERPL-MCNC: 6.5 G/DL (ref 6.4–8.4)
PROT UR-MCNC: 9 MG/DL
PROT/CREAT UR: 0.18 MG/G{CREAT} (ref 0–0.1)
RBC # BLD AUTO: 4.79 MILLION/UL (ref 3.81–5.12)
RBC MORPH BLD: PRESENT
RH BLD: POSITIVE
SAO2 % BLDCOV: 10.5 ML/DL
SODIUM SERPL-SCNC: 135 MMOL/L (ref 135–147)
SPECIMEN EXPIRATION DATE: NORMAL
WBC # BLD AUTO: 10.89 THOUSAND/UL (ref 4.31–10.16)

## 2023-03-28 PROCEDURE — 4A1HXCZ MONITORING OF PRODUCTS OF CONCEPTION, CARDIAC RATE, EXTERNAL APPROACH: ICD-10-PCS | Performed by: STUDENT IN AN ORGANIZED HEALTH CARE EDUCATION/TRAINING PROGRAM

## 2023-03-28 RX ORDER — CEFAZOLIN SODIUM 2 G/50ML
2000 SOLUTION INTRAVENOUS ONCE
Status: COMPLETED | OUTPATIENT
Start: 2023-03-28 | End: 2023-03-28

## 2023-03-28 RX ORDER — CHLOROPROCAINE HYDROCHLORIDE 30 MG/ML
INJECTION, SOLUTION EPIDURAL; INFILTRATION; INTRACAUDAL; PERINEURAL AS NEEDED
Status: DISCONTINUED | OUTPATIENT
Start: 2023-03-28 | End: 2023-03-28

## 2023-03-28 RX ORDER — FENTANYL CITRATE 50 UG/ML
INJECTION, SOLUTION INTRAMUSCULAR; INTRAVENOUS AS NEEDED
Status: DISCONTINUED | OUTPATIENT
Start: 2023-03-28 | End: 2023-03-28

## 2023-03-28 RX ORDER — ONDANSETRON 2 MG/ML
4 INJECTION INTRAMUSCULAR; INTRAVENOUS ONCE AS NEEDED
Status: DISCONTINUED | OUTPATIENT
Start: 2023-03-28 | End: 2023-03-29

## 2023-03-28 RX ORDER — ONDANSETRON 2 MG/ML
INJECTION INTRAMUSCULAR; INTRAVENOUS AS NEEDED
Status: DISCONTINUED | OUTPATIENT
Start: 2023-03-28 | End: 2023-03-28

## 2023-03-28 RX ORDER — HYDROMORPHONE HCL/PF 1 MG/ML
0.5 SYRINGE (ML) INJECTION
Status: DISCONTINUED | OUTPATIENT
Start: 2023-03-28 | End: 2023-03-29

## 2023-03-28 RX ORDER — KETOROLAC TROMETHAMINE 30 MG/ML
30 INJECTION, SOLUTION INTRAMUSCULAR; INTRAVENOUS EVERY 6 HOURS SCHEDULED
Status: DISCONTINUED | OUTPATIENT
Start: 2023-03-29 | End: 2023-03-29

## 2023-03-28 RX ORDER — OXYTOCIN/RINGER'S LACTATE 30/500 ML
PLASTIC BAG, INJECTION (ML) INTRAVENOUS CONTINUOUS PRN
Status: DISCONTINUED | OUTPATIENT
Start: 2023-03-28 | End: 2023-03-28

## 2023-03-28 RX ORDER — ROPIVACAINE HYDROCHLORIDE 2 MG/ML
INJECTION, SOLUTION EPIDURAL; INFILTRATION; PERINEURAL AS NEEDED
Status: DISCONTINUED | OUTPATIENT
Start: 2023-03-28 | End: 2023-03-28

## 2023-03-28 RX ORDER — SODIUM CHLORIDE, SODIUM LACTATE, POTASSIUM CHLORIDE, CALCIUM CHLORIDE 600; 310; 30; 20 MG/100ML; MG/100ML; MG/100ML; MG/100ML
125 INJECTION, SOLUTION INTRAVENOUS CONTINUOUS
Status: DISCONTINUED | OUTPATIENT
Start: 2023-03-28 | End: 2023-03-31 | Stop reason: HOSPADM

## 2023-03-28 RX ORDER — DEXAMETHASONE SODIUM PHOSPHATE 10 MG/ML
INJECTION, SOLUTION INTRAMUSCULAR; INTRAVENOUS AS NEEDED
Status: DISCONTINUED | OUTPATIENT
Start: 2023-03-28 | End: 2023-03-28

## 2023-03-28 RX ORDER — SODIUM CHLORIDE, SODIUM LACTATE, POTASSIUM CHLORIDE, CALCIUM CHLORIDE 600; 310; 30; 20 MG/100ML; MG/100ML; MG/100ML; MG/100ML
100 INJECTION, SOLUTION INTRAVENOUS CONTINUOUS
Status: DISCONTINUED | OUTPATIENT
Start: 2023-03-28 | End: 2023-03-31 | Stop reason: HOSPADM

## 2023-03-28 RX ORDER — ONDANSETRON 2 MG/ML
4 INJECTION INTRAMUSCULAR; INTRAVENOUS EVERY 6 HOURS PRN
Status: DISCONTINUED | OUTPATIENT
Start: 2023-03-28 | End: 2023-03-29

## 2023-03-28 RX ORDER — ACETAMINOPHEN 325 MG/1
650 TABLET ORAL EVERY 6 HOURS SCHEDULED
Status: DISCONTINUED | OUTPATIENT
Start: 2023-03-29 | End: 2023-03-29

## 2023-03-28 RX ORDER — NALOXONE HYDROCHLORIDE 0.4 MG/ML
0.1 INJECTION, SOLUTION INTRAMUSCULAR; INTRAVENOUS; SUBCUTANEOUS
Status: ACTIVE | OUTPATIENT
Start: 2023-03-28 | End: 2023-03-29

## 2023-03-28 RX ORDER — OXYTOCIN/RINGER'S LACTATE 30/500 ML
1-30 PLASTIC BAG, INJECTION (ML) INTRAVENOUS
Status: DISCONTINUED | OUTPATIENT
Start: 2023-03-28 | End: 2023-03-29

## 2023-03-28 RX ORDER — MORPHINE SULFATE 0.5 MG/ML
INJECTION, SOLUTION EPIDURAL; INTRATHECAL; INTRAVENOUS AS NEEDED
Status: DISCONTINUED | OUTPATIENT
Start: 2023-03-28 | End: 2023-03-28

## 2023-03-28 RX ORDER — BUPIVACAINE HYDROCHLORIDE 2.5 MG/ML
30 INJECTION, SOLUTION EPIDURAL; INFILTRATION; INTRACAUDAL ONCE AS NEEDED
Status: DISCONTINUED | OUTPATIENT
Start: 2023-03-28 | End: 2023-03-29

## 2023-03-28 RX ORDER — MIDAZOLAM HYDROCHLORIDE 2 MG/2ML
INJECTION, SOLUTION INTRAMUSCULAR; INTRAVENOUS AS NEEDED
Status: DISCONTINUED | OUTPATIENT
Start: 2023-03-28 | End: 2023-03-28

## 2023-03-28 RX ORDER — METHYLERGONOVINE MALEATE 0.2 MG/ML
INJECTION INTRAVENOUS AS NEEDED
Status: DISCONTINUED | OUTPATIENT
Start: 2023-03-28 | End: 2023-03-28

## 2023-03-28 RX ORDER — KETOROLAC TROMETHAMINE 30 MG/ML
INJECTION, SOLUTION INTRAMUSCULAR; INTRAVENOUS AS NEEDED
Status: DISCONTINUED | OUTPATIENT
Start: 2023-03-28 | End: 2023-03-28

## 2023-03-28 RX ORDER — LIDOCAINE HYDROCHLORIDE AND EPINEPHRINE 15; 5 MG/ML; UG/ML
INJECTION, SOLUTION EPIDURAL AS NEEDED
Status: DISCONTINUED | OUTPATIENT
Start: 2023-03-28 | End: 2023-03-28

## 2023-03-28 RX ORDER — BUTORPHANOL TARTRATE 1 MG/ML
1 INJECTION, SOLUTION INTRAMUSCULAR; INTRAVENOUS ONCE
Status: COMPLETED | OUTPATIENT
Start: 2023-03-28 | End: 2023-03-28

## 2023-03-28 RX ORDER — HYDROMORPHONE HCL/PF 1 MG/ML
0.5 SYRINGE (ML) INJECTION EVERY 2 HOUR PRN
Status: DISCONTINUED | OUTPATIENT
Start: 2023-03-28 | End: 2023-03-31 | Stop reason: HOSPADM

## 2023-03-28 RX ORDER — EPHEDRINE SULFATE 50 MG/ML
INJECTION INTRAVENOUS AS NEEDED
Status: DISCONTINUED | OUTPATIENT
Start: 2023-03-28 | End: 2023-03-28

## 2023-03-28 RX ORDER — TERBUTALINE SULFATE 1 MG/ML
INJECTION, SOLUTION SUBCUTANEOUS
Status: COMPLETED
Start: 2023-03-28 | End: 2023-03-28

## 2023-03-28 RX ORDER — FENTANYL CITRATE/PF 50 MCG/ML
50 SYRINGE (ML) INJECTION
Status: DISCONTINUED | OUTPATIENT
Start: 2023-03-28 | End: 2023-03-29

## 2023-03-28 RX ADMIN — SODIUM CHLORIDE, SODIUM LACTATE, POTASSIUM CHLORIDE, AND CALCIUM CHLORIDE 125 ML/HR: .6; .31; .03; .02 INJECTION, SOLUTION INTRAVENOUS at 14:05

## 2023-03-28 RX ADMIN — MORPHINE SULFATE 1.5 MG: 0.5 INJECTION, SOLUTION EPIDURAL; INTRATHECAL; INTRAVENOUS at 23:06

## 2023-03-28 RX ADMIN — SODIUM CHLORIDE, SODIUM LACTATE, POTASSIUM CHLORIDE, AND CALCIUM CHLORIDE 100 ML/HR: .6; .31; .03; .02 INJECTION, SOLUTION INTRAVENOUS at 21:00

## 2023-03-28 RX ADMIN — LIDOCAINE HYDROCHLORIDE AND EPINEPHRINE 3 ML: 15; 5 INJECTION, SOLUTION EPIDURAL at 18:35

## 2023-03-28 RX ADMIN — AZITHROMYCIN 500 MG: 500 INJECTION, POWDER, LYOPHILIZED, FOR SOLUTION INTRAVENOUS at 22:32

## 2023-03-28 RX ADMIN — MORPHINE SULFATE 3.5 MG: 0.5 INJECTION, SOLUTION EPIDURAL; INTRATHECAL; INTRAVENOUS at 23:02

## 2023-03-28 RX ADMIN — EPHEDRINE SULFATE 5 MG: 50 INJECTION, SOLUTION INTRAVENOUS at 23:22

## 2023-03-28 RX ADMIN — ROPIVACAINE HYDROCHLORIDE 5 ML: 2 INJECTION, SOLUTION EPIDURAL; INFILTRATION at 18:43

## 2023-03-28 RX ADMIN — EPHEDRINE SULFATE 5 MG: 50 INJECTION, SOLUTION INTRAVENOUS at 23:14

## 2023-03-28 RX ADMIN — LIDOCAINE HYDROCHLORIDE AND EPINEPHRINE 2 ML: 15; 5 INJECTION, SOLUTION EPIDURAL at 18:40

## 2023-03-28 RX ADMIN — FENTANYL CITRATE 100 MCG: 50 INJECTION, SOLUTION INTRAMUSCULAR; INTRAVENOUS at 22:41

## 2023-03-28 RX ADMIN — METHYLERGONOVINE MALEATE 0.2 MG: 0.2 INJECTION INTRAVENOUS at 22:57

## 2023-03-28 RX ADMIN — SODIUM CHLORIDE, SODIUM LACTATE, POTASSIUM CHLORIDE, AND CALCIUM CHLORIDE 125 ML/HR: .6; .31; .03; .02 INJECTION, SOLUTION INTRAVENOUS at 18:50

## 2023-03-28 RX ADMIN — CHLOROPROCAINE HYDROCHLORIDE 10 ML: 30 INJECTION, SOLUTION EPIDURAL; INFILTRATION; INTRACAUDAL; PERINEURAL at 22:32

## 2023-03-28 RX ADMIN — Medication 500 MILLI-UNITS/MIN: at 22:42

## 2023-03-28 RX ADMIN — KETOROLAC TROMETHAMINE 30 MG: 30 INJECTION, SOLUTION INTRAMUSCULAR at 22:58

## 2023-03-28 RX ADMIN — CHLOROPROCAINE HYDROCHLORIDE 10 ML: 30 INJECTION, SOLUTION EPIDURAL; INFILTRATION; INTRACAUDAL; PERINEURAL at 22:35

## 2023-03-28 RX ADMIN — CEFAZOLIN SODIUM 2000 MG: 2 SOLUTION INTRAVENOUS at 22:32

## 2023-03-28 RX ADMIN — TERBUTALINE SULFATE 1 MG: 1 INJECTION, SOLUTION SUBCUTANEOUS at 21:11

## 2023-03-28 RX ADMIN — MIDAZOLAM 2 MG: 1 INJECTION INTRAMUSCULAR; INTRAVENOUS at 22:47

## 2023-03-28 RX ADMIN — ROPIVACAINE HYDROCHLORIDE: 2 INJECTION, SOLUTION EPIDURAL; INFILTRATION at 18:45

## 2023-03-28 RX ADMIN — PHENYLEPHRINE HYDROCHLORIDE 50 MCG/MIN: 10 INJECTION, SOLUTION INTRAVENOUS at 22:46

## 2023-03-28 RX ADMIN — BUTORPHANOL TARTRATE 1 MG: 1 INJECTION, SOLUTION INTRAMUSCULAR; INTRAVENOUS at 16:28

## 2023-03-28 RX ADMIN — SODIUM CHLORIDE, SODIUM LACTATE, POTASSIUM CHLORIDE, AND CALCIUM CHLORIDE 125 ML/HR: .6; .31; .03; .02 INJECTION, SOLUTION INTRAVENOUS at 15:28

## 2023-03-28 RX ADMIN — SODIUM CHLORIDE, SODIUM LACTATE, POTASSIUM CHLORIDE, AND CALCIUM CHLORIDE 125 ML/HR: .6; .31; .03; .02 INJECTION, SOLUTION INTRAVENOUS at 20:24

## 2023-03-28 RX ADMIN — ONDANSETRON 4 MG: 2 INJECTION INTRAMUSCULAR; INTRAVENOUS at 22:32

## 2023-03-28 RX ADMIN — DEXAMETHASONE SODIUM PHOSPHATE 10 MG: 10 INJECTION, SOLUTION INTRAMUSCULAR; INTRAVENOUS at 22:32

## 2023-03-28 RX ADMIN — Medication 2 MILLI-UNITS/MIN: at 17:17

## 2023-03-28 NOTE — LETTER
[address] [place] [address]  Dept: [phone]    2023     Patient: Jose Sutherland   YOB: 1990   Date of Visit: 3/28/2023       To Whom it May Concern:    Jose Sutherland is under my professional care  She was seen in the hospital from 3/28/2023 to 23  She delivered a  on 3/28  Her partner was in the hospital with her at this time  If you have any questions or concerns, please don't hesitate to call           Sincerely,          Jimmie Wallis MD

## 2023-03-28 NOTE — H&P
Too [age] y o  female MRN: [id number]  Unit/Bed#: -01 Encounter: [id number]    Assessment: 27 y o  [id number] at 39w6d admitted for labor   SVE: 1 5/70/-3  Clinical EFW: 90th percentile ; Cephalic confirmed by ultrasound  GBS status: negative     Plan: Thrombocytopenia affecting pregnancy [facility]  Assessment & Plan  Platelets 091 on admission  Elevated blood-pressure reading without diagnosis of hypertension  Assessment & Plan  Systolic (95BAG), SQR:975 , Min:118 , IGA:703      Diastolic (16GVN), GFQ:85, Min:79, Max:84    Noted to have elevated pressure in triage on 3/27/23  PreE Labs wnl at that time  P:C ratio: 0 19 at that time  Consider repeat labs if additional elevated pressures  * 39 weeks gestation of pregnancy  Assessment & Plan  [facility]   Clear liquid diet   F/u T&S, CBC, RPR   IVF LR 125cc/hr   Continuous fetal monitoring and tocometry   Analgesia at maternal request   Vertex by TAUS  Expectant management, cyto and FB following by pitocin titration if unchanged            Discussed case and plan w/ Dr Fauzia Mistry      Chief Complaint: contractions    HPI: Alea Fabian is a 27 y o  [id number] with an ELKIN of 3/29/2023, by Last Menstrual Period at 39w6d who is being admitted for labor   She complains of uterine contractions, occurring every 7 minutes, has no LOF, and reports spotting  She states she has felt good FM  Patient Active Problem List   Diagnosis   • 39 weeks gestation of pregnancy   • Asthma affecting pregnancy in third trimester   • Thalassemia alpha carrier   • Fetal macrosomia, third trimester, not applicable or unspecified fetus   • ASCUS with positive high risk HPV cervical   • Vitamin D deficiency   • Elevated blood-pressure reading without diagnosis of hypertension   • Thrombocytopenia affecting pregnancy ([facility] [address] )       Baby complications/comments: none    Review of Systems   Constitutional: Negative for chills and fever     Respiratory: Negative for cough, shortness of breath and wheezing  Cardiovascular: Negative for chest pain and leg swelling  Gastrointestinal: Negative for abdominal pain, diarrhea, nausea and vomiting  Genitourinary: Negative for pelvic pain, vaginal bleeding and vaginal discharge  Musculoskeletal: Negative for back pain  Neurological: Negative for weakness, light-headedness and headaches  OB Hx:  OB History    Para Term  AB Living   5 2 2 0 2 2   SAB IAB Ectopic Multiple Live Births   1 1 0 0 2      # Outcome Date GA Lbr Kingsley/2nd Weight Sex Delivery Anes PTL Lv   5 Current            4 IAB 2020           3 SAB            2 Term    3175 g (7 lb) F Vag-Spont   PANTERA   1 Term    2863 g (6 lb 5 oz) M Vag-Spont   PANTERA       Past Medical Hx:  Past Medical History:   Diagnosis Date   • Asthma        Past Surgical hx:  No past surgical history on file  Social Hx:  Alcohol use: denies  Tobacco use: denies  Other substance use: denies    No Known Allergies    Medications Prior to Admission   Medication   • Prenatal Vit-Fe Fumarate-FA (PRENATAL PO)       Objective:  Temp:  [98 3 °F (36 8 °C)] 98 3 °F (36 8 °C)  HR:  [97] 97  Resp:  [18] 18  BP: (118)/(79-84) 118/79  Body mass index is 33 45 kg/m²  Physical Exam:  Physical Exam  Constitutional:       Appearance: Normal appearance  Cardiovascular:      Rate and Rhythm: Normal rate and regular rhythm  Pulmonary:      Effort: Pulmonary effort is normal  No respiratory distress  Abdominal:      Palpations: Abdomen is soft  Tenderness: There is no abdominal tenderness  Musculoskeletal:         General: No swelling or tenderness  Neurological:      General: No focal deficit present  Mental Status: She is alert and oriented to person, place, and time  Skin:     General: Skin is warm and dry  Vitals reviewed              FHT:  Baseline Rate: 140 bpm  Variability: Moderate 6-25 bpm  Accelerations: 15 x 15 or greater  Decelerations: Episodic (during IV start)    TOCO:   Contraction Frequency (minutes): irritability    Lab Results   Component Value Date    WBC 8 97 03/27/2023    HGB 10 9 (L) 03/27/2023    HCT 35 6 03/27/2023     (L) 03/27/2023     Lab Results   Component Value Date    K 3 6 03/27/2023     03/27/2023    CO2 22 03/27/2023    BUN 9 03/27/2023    CREATININE 0 58 (L) 03/27/2023    AST 11 (L) 03/27/2023    ALT 8 03/27/2023     Prenatal Labs: Reviewed      Blood type: O+  Antibody: Negative  GBS: Negative  HIV: Non-reactive   Rubella: Immune  Syphilis IgM/IgG: Non-reactive   HBsAg: Non-reactive  HCAb: Not completed  Chlamydia: negative  Gonorrhea: negative  Diabetes 1 hour screen: 152  3 hour glucose: 205,136, 103  Platelets: 968  Hgb: 10 9  >2 Midnights  INPATIENT     Signature/Title: Olivia Miller MD  Date: 3/28/2023  Time: 2:27 PM

## 2023-03-28 NOTE — ASSESSMENT & PLAN NOTE
Systolic (18WQM), ULE:421 , Min:118 , JSA:702      Diastolic (56SFF), LRF:64, Min:79, Max:84    Noted to have elevated pressure in triage on 3/27/23, met criteria with elevated Bps in labor  SRBPx1 during epidural  Plts 114 on admission  F/u CMP and PCr

## 2023-03-28 NOTE — OB LABOR/OXYTOCIN SAFETY PROGRESS
Labor Progress Note - Albina Vilchis 27 y o  female MRN: [id number]    Unit/Bed#: -01 Encounter: [id number]       Contraction Frequency (minutes): occasional  Contraction Quality: Mild  Tachysystole: No (Simultaneous filing  User may not have seen previous data )   Cervical Dilation: 1-2        Cervical Effacement: 70  Fetal Station: -3  Baseline Rate: 140 bpm  Fetal Heart Rate: 140 BPM (Simultaneous filing  User may not have seen previous data )  FHR Category: Category I               Vital Signs:   Vitals:    03/28/23 1407   BP: 130/78   Pulse: 87   Resp: 18   Temp: 98 1 °F (36 7 °C)   SpO2: 98%       Notes/comments:   SVE as above  Is now status post Syed balloon  Patient is currently comfortable without epidural  Begin pitocin titration  Discussed with Dr Randall Vazquez  Patient was also counseled on the risks of shoulder dystocia at this time as fetal weight is greater than 90th percentile  Risks include injury to maternal tissue, or injury to the fetus including nerve damage in the upper extremities  Patient counseled on common maneuvers performed             Jann Tyler MD 3/28/2023 4:53 PM

## 2023-03-28 NOTE — OB LABOR/OXYTOCIN SAFETY PROGRESS
Labor Progress Note - Santos Page 27 y o  female MRN: [id number]    Unit/Bed#: -01 Encounter: [id number]       Contraction Frequency (minutes): irritability  Contraction Quality: Mild  Tachysystole: No   Cervical Dilation: 1-2        Cervical Effacement: 70  Fetal Station: Ballotable  Baseline Rate: 150 bpm  Fetal Heart Rate: 161 BPM  FHR Category: Category II               Vital Signs:   Vitals:    03/28/23 1407   BP: 130/78   Pulse: 87   Resp: 18   Temp: 98 1 °F (36 7 °C)   SpO2: 98%       Notes/comments: A 24F zuñiga with a 30cc balloon was selected  SVE was performed and cervix was located  Zuñiga was introduced over sterile gloved hands  Balloon advanced through cervix beyond the internal cervical os  A small amount amount of sterile normal saline solution was instilled in the balloon to confirm placement  Placement was confirmed to be beyond the internal cervical os  A total of 60cc of sterile normal saline solution was instilled into the balloon  Patient tolerated well  Instructions left with RN to place zuñiga to gravity with a 1L bag of IV fluid  Notify MD when zuñiga dislodged      If FHT remains category 1 for 20 minutes after insertion, plan to start pitocin titration          Francisco Brown MD 3/28/2023 3:37 PM

## 2023-03-28 NOTE — ANESTHESIA PREPROCEDURE EVALUATION
Procedure:  LABOR ANALGESIA    Relevant Problems   GYN   (+) 39 weeks gestation of pregnancy      HEMATOLOGY   (+) Thalassemia alpha carrier   (+) Thrombocytopenia affecting pregnancy ([facility] [address] )      PULMONARY   (+) Asthma affecting pregnancy in third trimester      plts 114 today 3/28    Physical Exam    Airway    Mallampati score: II         Dental       Cardiovascular  Cardiovascular exam normal    Pulmonary  Pulmonary exam normal     Other Findings        Anesthesia Plan  ASA Score- 2     Anesthesia Type- epidural with ASA Monitors  Additional Monitors:   Airway Plan:           Plan Factors-Exercise tolerance (METS): >4 METS  Chart reviewed  Existing labs reviewed  Patient summary reviewed  Induction-     Postoperative Plan-     Informed Consent- Anesthetic plan and risks discussed with patient  I personally reviewed this patient with the CRNA  Discussed and agreed on the Anesthesia Plan with the CRNA  Toshia Yoder

## 2023-03-28 NOTE — ANESTHESIA PROCEDURE NOTES
Epidural Block    Patient location during procedure: OB  Start time: 3/28/2023 6:35 PM  Reason for block: procedure for pain and at surgeon's request  Staffing  Performed: CRNA   Anesthesiologist: Mary Grace Simms MD  Resident/CRNA: Zacarias Turner CRNA  Preanesthetic Checklist  Completed: patient identified, IV checked, site marked, risks and benefits discussed, surgical consent, monitors and equipment checked, pre-op evaluation and timeout performed  Epidural  Patient position: sitting  Prep: ChloraPrep  Patient monitoring: cardiac monitor and frequent blood pressure checks  Approach: midline  Location: lumbar  Injection technique: ROSARIO saline  Needle  Needle type: Tuohy   Needle gauge: 18 G  Catheter type: side hole  Catheter size: 20 G  Catheter at skin depth: 13 cm  Catheter securement method: stabilization device  Test dose: negative  Assessment  Sensory level: T10  Number of attempts: 2negative aspiration for CSF, negative aspiration for heme and no paresthesia on injection  patient tolerated the procedure well with no immediate complications  Additional Notes  2 attempts, os on first attempt

## 2023-03-28 NOTE — ASSESSMENT & PLAN NOTE
Admit to OBGYN   Clear liquid diet   F/u T&S, CBC, RPR   IVF LR 125cc/hr   Continuous fetal monitoring and tocometry   Analgesia at maternal request   Vertex by TAUS  Expectant management, cyto and FB following by pitocin titration if unchanged

## 2023-03-28 NOTE — PLAN OF CARE
Problem: PAIN - ADULT  Goal: Verbalizes/displays adequate comfort level or baseline comfort level  Description: Interventions:  - Encourage patient to monitor pain and request assistance  - Assess pain using appropriate pain scale  - Administer analgesics based on type and severity of pain and evaluate response  - Implement non-pharmacological measures as appropriate and evaluate response  - Consider cultural and social influences on pain and pain management  - Notify physician/advanced practitioner if interventions unsuccessful or patient reports new pain  Outcome: Progressing     Problem: INFECTION - ADULT  Goal: Absence or prevention of progression during hospitalization  Description: INTERVENTIONS:  - Assess and monitor for signs and symptoms of infection  - Monitor lab/diagnostic results  - Monitor all insertion sites, i e  indwelling lines, tubes, and drains  - Monitor endotracheal if appropriate and nasal secretions for changes in amount and color  - Sanderson appropriate cooling/warming therapies per order  - Administer medications as ordered  - Instruct and encourage patient and family to use good hand hygiene technique  - Identify and instruct in appropriate isolation precautions for identified infection/condition  Outcome: Progressing  Goal: Absence of fever/infection during neutropenic period  Description: INTERVENTIONS:  - Monitor WBC    Outcome: Progressing     Problem: SAFETY ADULT  Goal: Patient will remain free of falls  Description: INTERVENTIONS:  - Educate patient/family on patient safety including physical limitations  - Instruct patient to call for assistance with activity   - Consult OT/PT to assist with strengthening/mobility   - Keep Call bell within reach  - Keep bed low and locked with side rails adjusted as appropriate  - Keep care items and personal belongings within reach  - Initiate and maintain comfort rounds    Outcome: Progressing     Problem: Knowledge Deficit  Goal: Patient/family/caregiver demonstrates understanding of disease process, treatment plan, medications, and discharge instructions  Description: Complete learning assessment and assess knowledge base    Interventions:  - Provide teaching at level of understanding  - Provide teaching via preferred learning methods  Outcome: Progressing     Problem: DISCHARGE PLANNING  Goal: Discharge to home or other facility with appropriate resources  Description: INTERVENTIONS:  - Identify barriers to discharge w/patient and caregiver  - Arrange for needed discharge resources and transportation as appropriate  - Identify discharge learning needs (meds, wound care, etc )  - Arrange for interpretive services to assist at discharge as needed  - Refer to Case Management Department for coordinating discharge planning if the patient needs post-hospital services based on physician/advanced practitioner order or complex needs related to functional status, cognitive ability, or social support system  Outcome: Progressing

## 2023-03-28 NOTE — OB LABOR/OXYTOCIN SAFETY PROGRESS
Oxytocin Safety Progress Check Note - Annika Salas 27 y o  female MRN: [id number]    Unit/Bed#: -01 Encounter: [id number]    Dose (brooks-units/min) Oxytocin: 2 brooks-units/min  Contraction Frequency (minutes): 2-3 5  Contraction Quality: Mild  Tachysystole: No   Cervical Dilation: 4-5        Cervical Effacement: 80  Fetal Station: -3  Baseline Rate: 125 bpm  Fetal Heart Rate: 129 BPM  FHR Category: Category I               Vital Signs:   Vitals:    03/28/23 1900   BP: 135/77   Pulse: 84   Resp:    Temp:    SpO2:        Notes/comments: Patient comfortable with epidural  SROM for blood-tinged fluid  SVE as above  Continue to monitor           David Ramires MD 3/28/2023 7:26 PM

## 2023-03-29 LAB
ERYTHROCYTE [DISTWIDTH] IN BLOOD BY AUTOMATED COUNT: 16.5 % (ref 11.6–15.1)
HCT VFR BLD AUTO: 30.2 % (ref 34.8–46.1)
HGB BLD-MCNC: 9.1 G/DL (ref 11.5–15.4)
MCH RBC QN AUTO: 22.9 PG (ref 26.8–34.3)
MCHC RBC AUTO-ENTMCNC: 30.1 G/DL (ref 31.4–37.4)
MCV RBC AUTO: 76 FL (ref 82–98)
PLATELET # BLD AUTO: 97 THOUSANDS/UL (ref 149–390)
RBC # BLD AUTO: 3.97 MILLION/UL (ref 3.81–5.12)
TREPONEMA PALLIDUM IGG+IGM AB [PRESENCE] IN SERUM OR PLASMA BY IMMUNOASSAY: NORMAL
WBC # BLD AUTO: 13.89 THOUSAND/UL (ref 4.31–10.16)

## 2023-03-29 RX ORDER — CALCIUM CARBONATE 200(500)MG
1000 TABLET,CHEWABLE ORAL DAILY PRN
Status: DISCONTINUED | OUTPATIENT
Start: 2023-03-29 | End: 2023-03-31 | Stop reason: HOSPADM

## 2023-03-29 RX ORDER — ONDANSETRON 2 MG/ML
4 INJECTION INTRAMUSCULAR; INTRAVENOUS EVERY 8 HOURS PRN
Status: DISCONTINUED | OUTPATIENT
Start: 2023-03-29 | End: 2023-03-31 | Stop reason: HOSPADM

## 2023-03-29 RX ORDER — SIMETHICONE 80 MG
80 TABLET,CHEWABLE ORAL 4 TIMES DAILY PRN
Status: DISCONTINUED | OUTPATIENT
Start: 2023-03-29 | End: 2023-03-31 | Stop reason: HOSPADM

## 2023-03-29 RX ORDER — SENNOSIDES 8.6 MG
1 TABLET ORAL DAILY
Status: DISCONTINUED | OUTPATIENT
Start: 2023-03-29 | End: 2023-03-31 | Stop reason: HOSPADM

## 2023-03-29 RX ORDER — DOCUSATE SODIUM 100 MG/1
100 CAPSULE, LIQUID FILLED ORAL 2 TIMES DAILY
Status: DISCONTINUED | OUTPATIENT
Start: 2023-03-29 | End: 2023-03-31 | Stop reason: HOSPADM

## 2023-03-29 RX ORDER — IBUPROFEN 600 MG/1
600 TABLET ORAL EVERY 6 HOURS SCHEDULED
Status: DISCONTINUED | OUTPATIENT
Start: 2023-03-30 | End: 2023-03-31 | Stop reason: HOSPADM

## 2023-03-29 RX ORDER — ENOXAPARIN SODIUM 100 MG/ML
40 INJECTION SUBCUTANEOUS DAILY
Status: DISCONTINUED | OUTPATIENT
Start: 2023-03-30 | End: 2023-03-31 | Stop reason: HOSPADM

## 2023-03-29 RX ORDER — ACETAMINOPHEN 325 MG/1
650 TABLET ORAL EVERY 6 HOURS SCHEDULED
Status: DISCONTINUED | OUTPATIENT
Start: 2023-03-29 | End: 2023-03-29 | Stop reason: SDUPTHER

## 2023-03-29 RX ORDER — FERROUS SULFATE 325(65) MG
325 TABLET ORAL
Status: DISCONTINUED | OUTPATIENT
Start: 2023-03-30 | End: 2023-03-31 | Stop reason: HOSPADM

## 2023-03-29 RX ORDER — DIPHENHYDRAMINE HCL 25 MG
25 TABLET ORAL EVERY 6 HOURS PRN
Status: DISCONTINUED | OUTPATIENT
Start: 2023-03-29 | End: 2023-03-31 | Stop reason: HOSPADM

## 2023-03-29 RX ORDER — ACETAMINOPHEN 325 MG/1
650 TABLET ORAL EVERY 6 HOURS SCHEDULED
Status: DISCONTINUED | OUTPATIENT
Start: 2023-03-29 | End: 2023-03-31 | Stop reason: HOSPADM

## 2023-03-29 RX ORDER — MAGNESIUM HYDROXIDE/ALUMINUM HYDROXICE/SIMETHICONE 120; 1200; 1200 MG/30ML; MG/30ML; MG/30ML
15 SUSPENSION ORAL EVERY 6 HOURS PRN
Status: DISCONTINUED | OUTPATIENT
Start: 2023-03-29 | End: 2023-03-31 | Stop reason: HOSPADM

## 2023-03-29 RX ORDER — OXYTOCIN/RINGER'S LACTATE 30/500 ML
62.5 PLASTIC BAG, INJECTION (ML) INTRAVENOUS CONTINUOUS
Status: ACTIVE | OUTPATIENT
Start: 2023-03-29 | End: 2023-03-29

## 2023-03-29 RX ORDER — KETOROLAC TROMETHAMINE 30 MG/ML
30 INJECTION, SOLUTION INTRAMUSCULAR; INTRAVENOUS EVERY 6 HOURS SCHEDULED
Status: DISPENSED | OUTPATIENT
Start: 2023-03-29 | End: 2023-03-29

## 2023-03-29 RX ADMIN — KETOROLAC TROMETHAMINE 30 MG: 30 INJECTION, SOLUTION INTRAMUSCULAR; INTRAVENOUS at 17:44

## 2023-03-29 RX ADMIN — ACETAMINOPHEN 650 MG: 325 TABLET ORAL at 02:25

## 2023-03-29 RX ADMIN — FENTANYL CITRATE 50 MCG: 50 INJECTION INTRAMUSCULAR; INTRAVENOUS at 00:08

## 2023-03-29 RX ADMIN — ACETAMINOPHEN 650 MG: 325 TABLET ORAL at 08:34

## 2023-03-29 RX ADMIN — DOCUSATE SODIUM 100 MG: 100 CAPSULE, LIQUID FILLED ORAL at 08:33

## 2023-03-29 RX ADMIN — ACETAMINOPHEN 650 MG: 325 TABLET ORAL at 19:46

## 2023-03-29 RX ADMIN — SODIUM CHLORIDE, SODIUM LACTATE, POTASSIUM CHLORIDE, AND CALCIUM CHLORIDE 125 ML/HR: .6; .31; .03; .02 INJECTION, SOLUTION INTRAVENOUS at 08:35

## 2023-03-29 RX ADMIN — Medication 62.5 MILLI-UNITS/MIN: at 00:21

## 2023-03-29 RX ADMIN — SENNOSIDES 8.6 MG: 8.6 TABLET, FILM COATED ORAL at 08:33

## 2023-03-29 RX ADMIN — SODIUM CHLORIDE, SODIUM LACTATE, POTASSIUM CHLORIDE, AND CALCIUM CHLORIDE 125 ML/HR: .6; .31; .03; .02 INJECTION, SOLUTION INTRAVENOUS at 00:29

## 2023-03-29 RX ADMIN — IBUPROFEN 600 MG: 600 TABLET, FILM COATED ORAL at 23:27

## 2023-03-29 RX ADMIN — DOCUSATE SODIUM 100 MG: 100 CAPSULE, LIQUID FILLED ORAL at 17:44

## 2023-03-29 RX ADMIN — SIMETHICONE 80 MG: 80 TABLET, CHEWABLE ORAL at 17:44

## 2023-03-29 RX ADMIN — FENTANYL CITRATE 50 MCG: 50 INJECTION INTRAMUSCULAR; INTRAVENOUS at 00:37

## 2023-03-29 RX ADMIN — KETOROLAC TROMETHAMINE 30 MG: 30 INJECTION, SOLUTION INTRAMUSCULAR; INTRAVENOUS at 12:37

## 2023-03-29 NOTE — UTILIZATION REVIEW
"NOTIFICATION OF INPATIENT ADMISSION   MATERNITY/DELIVERY AUTHORIZATION REQUEST   SERVICING FACILITY:   [facility] L&D, [place], NICU  [address]  Tax ID: [id number]  NPI: [id number]   ATTENDING PROVIDER:  Attending Name and NPI#: Yang Castaneda Md [[id number]]  Address: [address] [place] [address]  Phone: [phone]   ADMISSION INFORMATION:  Place of Service: [facility]patient [facility]  [address]  Place of Service Code: 21  Inpatient Admission Date/Time: 3/28/23  1:54 PM  Discharge Date/Time: No discharge date for patient encounter  Admitting Diagnosis Code/Description:  Pregnant [Z34 90]  Uterine contractions during pregnancy [O47 9]  Encounter for  delivery without indication [O82]     Mother: Aggie Pop 1990 Estimated Date of Delivery: 3/29/23  Delivering clinician: Caryn Villalba    OB History        5    Para   3    Term   3       0    AB   2    Living   3       SAB   1    IAB   1    Ectopic   0    Multiple   0    Live Births   3                Name & MRN:   Information for the patient's :  Benita Chirinos [[id number]]      Delivery Information:  Sex: male  Delivered 3/28/2023 10:42 PM by , Low Transverse; Gestational Age: 37w11d     Measurements:  Weight: 7 lb 9 5 oz (3445 g); Height: 20\"    APGAR 1 minute 5 minutes 10 minutes   Totals: 9 9      [place] Birth Information: 27 y o  female MRN: [id number] Unit/Bed#: -01   Birthweight: No birth weight on file  Gestational Age: <None> Delivery Type:    APGARS Totals:        UTILIZATION REVIEW CONTACT:  Jami Spaulding, Utilization   Network Utilization Review Department  Phone: [phone]  Fax [phone]  Email: [email or web address]  Contact for approvals/pending authorizations, clinical reviews, and discharge       PHYSICIAN ADVISORY SERVICES:  Medical Necessity Denial & " [id number] Review  Phone: [phone]  Fax: [phone]  Email: [email or web address]

## 2023-03-29 NOTE — ANESTHESIA POSTPROCEDURE EVALUATION
Post-Op Assessment Note    CV Status:  Stable  Pain Score: 0    Pain management: adequate     Mental Status:  Alert and awake   Hydration Status:  Euvolemic   PONV Controlled:  Controlled   Airway Patency:  Patent      Post Op Vitals Reviewed: Yes      Staff: CRNA         No notable events documented      BP   118/65   Temp   98 5   Pulse  96   Resp   20   SpO2   98

## 2023-03-29 NOTE — OB LABOR/OXYTOCIN SAFETY PROGRESS
Oxytocin Safety Progress Check Note - Althea Izquierdo 27 y o  female MRN: [id number]    Unit/Bed#: -01 Encounter: [id number]    Dose (brooks-units/min) Oxytocin: 6 brooks-units/min  Contraction Frequency (minutes): 4  Contraction Quality: Moderate, Strong  Tachysystole: No     Cervical Dilation: 4-5  Cervical Effacement: 80  Fetal Station: -3     Baseline Rate: 120 bpm  Fetal Heart Rate: 120 BPM  FHR Category: Category II    Pt with prolonged decel  Turned from side to side, hands and knees  Fluid bolus given, pit held  Uterus felt to be firm, terbutaline given  Dr Eva Florence in [place]; called SOD Dr Lizeth Pena to bedside  Fetal head high on exam, exam unchanged  OR opened for prolonged decel, concern for need to stat 1LTCS  Fetal heart tones returned to baseline 120/115  Prior to prolonged decel, recurrent variables noted  IUPC previously placed, amnioinfusion being set up  Total length of decel approx 11 min; difficult to ascertain from strip however audibly down/disctoninuous during that time      Vital Signs:   Vitals:    03/28/23 2036   BP: 135/84   Pulse: 83   Resp:    Temp:    SpO2:        Augustine Jackson MD 3/28/2023 9:20 PM

## 2023-03-29 NOTE — LACTATION NOTE
This note was copied from a baby's chart  CONSULT - LACTATION  Baby Boy Franco Sanford) Sammi Guadarrama 1 days male MRN: [id number]    [facility] NURSERY Room / Bed: (N)/(N) Encounter: [id number]    Maternal Information     MOTHER:  Rella Phoenix  Maternal Age: 27 y o    OB History: # 1 - Date: , Sex: Male, Weight: 2863 g (6 lb 5 oz), GA: None, Delivery: Vaginal, Spontaneous, Apgar1: None, Apgar5: None, Living: Living, Birth Comments: None    # 2 - Date: , Sex: Female, Weight: 3175 g (7 lb), GA: None, Delivery: Vaginal, Spontaneous, Apgar1: None, Apgar5: None, Living: Living, Birth Comments: None    # 3 - Date: , Sex: None, Weight: None, GA: None, Delivery: None, Apgar1: None, Apgar5: None, Living: None, Birth Comments: None    # 4 - Date: , Sex: None, Weight: None, GA: None, Delivery: None, Apgar1: None, Apgar5: None, Living: None, Birth Comments: None    # 5 - Date: 23, Sex: Male, Weight: 3445 g (7 lb 9 5 oz), GA: 39w6d, Delivery: , Low Transverse, Apgar1: 9, Apgar5: 9, Living: Living, Birth Comments: None   Previouse breast reduction surgery?  No    Lactation history:   Has patient previously breast fed: Yes   How long had patient previously breast fed: 2 years and one year   Previous breast feeding complications: None     Past Surgical History:   Procedure Laterality Date   • LIPOSUCTION          Birth information:  YOB: 2025   Time of birth: 10:42 PM   Sex: male   Delivery type: , Low Transverse   Birth Weight: 3445 g (7 lb 9 5 oz)   Percent of Weight Change: 0%     Gestational Age: 37w11d     Breastfeeding Progress Breastfeeding well  (Per Ortiz Mcintyre)   Reasons for not Breastfeeding Maternal preference  (Also giving formula per nutrition plan)   Other OB Lactation Tools   Feeding Devices Bottle   Patient Follow-Up   Lactation Consult Status 2   Follow-Up Type Inpatient;Call as needed   Other OB Lactation Documentation Additional Problem Noted Jaciel Jimenez is breast and bottle feeding  She said Irish Ortiz was not latching well yesterday, but is latching to the breast much better today  Denies needs for assistance with feeding  (RSB and D/C left at bedside for [facility] confidence with topic of breastfeeding saying she is an [address] who provides sleep training for infants )       Feeding recommendations:  breast feed on demand     Encouraged parents to call for assistance, questions, and concerns about breastfeeding  Extension provided        Kathy Sheikh RN 3/29/2023 4:52 PM

## 2023-03-29 NOTE — DISCHARGE INSTRUCTIONS
Self Care After Delivery   AMBULATORY CARE:   The postpartum period  is the period of time from delivery to about 6 weeks  During this time you may experience many physical and emotional changes  It is important to understand what is normal and when you need to call your healthcare provider  It is also important to know how to care for yourself during this time  Call your local emergency number (911 in the [address] [place] [address],[address]) for any of the following: You see or hear things that are not there, or have thoughts of harming yourself or your baby  You soak through 1 pad in 15 minutes, have blurry vision, clammy or pale skin, and feel faint  You faint or lose consciousness  You have trouble breathing  You cough up blood  Your  incision comes apart  Seek care immediately if:   Your heart is beating faster than usual     You have a bad headache or changes in your vision  Your episiotomy or  incision is red, swollen, bleeding, or draining pus  You have severe abdominal pain  Call your doctor or obstetrician if:   Your leg is painful, red, and larger than usual     You soak through 1 or more pads in an hour, or pass blood clots larger than a quarter from your vagina  You have a fever  You have new or worsening pain in your abdomen or vagina  You continue to have depression 1 to 2 weeks after you deliver  You have trouble sleeping  You have foul-smelling discharge from your vagina  You have pain or burning when you urinate  You do not have a bowel movement for 3 days or more  You have nausea or are vomiting  You have hard lumps or red streaks over your breasts  You have cracked nipples or bleed from your nipples  You have questions or concerns about your condition or care  Physical changes:   The following are normal changes after you give birth:  Pain in the area between your anus and vagina    Breast pain    Constipation or hemorrhoids    Hot or cold flashes    Vaginal bleeding or discharge    Mild to moderate abdominal cramping    Difficulty controlling bowel movements or urine    Emotional changes:  A drop in hormone levels after you deliver may cause changes in your emotions  You may feel irritable, sad, or anxious  You may cry easily or for no reason  You may also feel depressed  Depression that continues can be a sign of postpartum depression, a condition that can be treated  Treatment may include talk therapy, medicines, or both  Healthcare providers will ask how you are feeling and if you have any depression  These talks can happen during appointments for your medical care and for your baby's care, such as well child visits  Providers can help you find ways to care for yourself and your baby  Talk to your providers about the following:  When emotional changes or depression started, and if it is getting worse over time    Problems you are having with daily activities, sleep, or caring for your baby    If anything makes you feel worse, or makes you feel better    Feeling that you are not bonding with your baby the way you want    Any problems your baby has with sleeping or feeding    Your baby is fussy or cries a lot    Support you have from friends, family, or others    Breast care for breastfeeding mothers: You may have sore breasts for 3 to 6 days after you give birth  This happens as your milk begins to fill your breasts  You may also have sore breasts if you do not breastfeed frequently  Do the following to care for your breasts:  Apply a moist, warm, compress to your breast as directed  This may help soothe your breasts  Make sure the washcloth is not too hot before you apply it to your breast     Nurse your baby or pump your milk frequently  This may prevent clogged milk ducts  Ask your healthcare provider how often to nurse or pump  Massage your breasts as directed  This may help increase your milk flow   Gently rub your breasts in a circular motion before you breastfeed  You may need to gently squeeze your breast or nipple to help release milk  You can also use a breast pump to help release milk from your breast     Wash your breasts with warm water only  Do not put soap on your nipples  Soap may cause your nipples to become dry  Apply lanolin cream to your nipples as directed  Lanolin cream may add moisture to your skin and prevent nipple dryness  Always  wash off lanolin cream with warm water before you breastfeed  Place pads in your bra  Your nipples may leak milk when you are not breastfeeding  You can place pads inside of your bra to help prevent leaking onto your clothing  Ask your healthcare provider where to purchase bra pads  Get breastfeeding support if needed  Healthcare providers can answer questions about breastfeeding and provide you with support  Ask your healthcare provider who you can contact if you need breastfeeding support  Breast care for non-breastfeeding mothers:  Milk will fill your breasts even if you bottle feed your baby  Do the following to help stop your milk from filling your breasts and causing pain:  Wear a bra with support at all times  A sports bra or a tight-fitting bra will help stop your milk from coming in  Apply ice on each breast for 15 to 20 minutes every hour or as directed  Use an ice pack, or put crushed ice in a plastic bag  Cover it with a towel before you apply it to your breast  Ice helps your milk ducts shrink  Keep your breasts away from warm water  Warm water will make it easier for milk to fill your breasts  Stand with your breasts away from warm water in the shower  Limit how much you touch your breasts  This will prevent them from filling with milk  Perineum care: Your perineum is the area between your rectum and vagina  It is normal to have swelling and pain in this area after you give birth   If you had an episiotomy, your healthcare provider may give you special instructions  Clean your perineum after you use the bathroom  This may prevent infection and help with healing  Use a spray bottle with warm water to clean your perineum  You may also gently spray warm water against your perineum when you urinate  Always wipe front to back  Take a sitz bath as directed  A sitz bath may help relieve swelling and pain  Fill your bath tub or bucket with water up to your hips and sit in the water  Use cold water for 2 days after you deliver  Then use warm water  Ask your healthcare provider for more information about a sitz bath  Apply ice packs for the first 24 hours or as directed  Use a plastic glove filled with ice or buy an ice pack  Wrap the ice pack or plastic glove in a small towel or wash cloth  Place the ice pack on your perineum for 20 minutes at a time  Sit on a donut-shaped pillow  This may relieve pressure on your perineum when you sit  Use wipes that contain medicine or take pills as directed  Your healthcare provider may tell you to use witch hazel pads  You can place witch hazel pads in the refrigerator before you apply them to your perineum  Your provider may also tell you to take NSAIDs  Ask him or her how often to take pills or use the wipes  Do not go swimming or take tub baths for 4 to 6 weeks or as directed  This will help prevent an infection in your vagina or uterus  Bowel and bladder care: It may take 3 to 5 days to have a bowel movement after you deliver your baby  You can do the following to prevent or manage constipation, and get control of your bowel or bladder:  Take stool softeners as directed  A stool softener is medicine that will make your bowel movements softer  This may prevent or relieve constipation  A stool softener may also make bowel movements less painful  Drink plenty of liquids  Ask how much liquid to drink each day and which liquids are best for you  Liquids may help prevent constipation      Eat foods high in fiber   Examples include fruits, vegetables, grains, beans, and lentils  Ask your healthcare provider how much fiber you need each day  Fiber may prevent constipation  Do Kegel exercises as directed  Kegel exercises will help strengthen the muscles that control bowel movements and urination  Ask your healthcare provider for more information on Kegel exercises  Apply cold compresses or medicine to hemorrhoids as directed  This may relieve swelling and pain  Your healthcare provider may tell you to apply ice or wipes that contain medicine to your hemorrhoids  He or she may also tell you to use a sitz bath  Ask your provider for more information on how to manage hemorrhoids  Nutrition:  Good nutrition is important in the postpartum period  It will help you return to a healthy weight, increase your energy levels, and prevent constipation  It will also help you get enough nutrients and calories if you are going to breastfeed your baby  Eat a variety of healthy foods  Healthy foods include fruits, vegetables, whole-grain breads, low-fat dairy products, beans, lean meats, and fish  You may need 500 to 700 extra calories each day if you breastfeed your baby  You may also need extra protein  Limit foods with added sugar and high amounts of fat  These foods are high in calories and low in healthy nutrients  Read food labels so you know how much sugar and fat is in the food you want to eat  Drink 8 to 10 glasses of water per day  Water will help you make plenty of milk for your baby  It will also help prevent constipation  Drink a glass of water every time you breastfeed your baby  Take vitamins as directed  Ask your healthcare provider what vitamins you need  Limit caffeine and alcohol if you are breastfeeding  Caffeine and alcohol can get into your breast milk  Caffeine and alcohol can make your baby fussy  They can also interfere with your baby's sleep   Ask your healthcare provider if you can drink alcohol or caffeine  Rest and sleep: You may feel very tired in the postpartum period  Enough sleep will help you heal and give you energy to care for your baby  The following may help you get sleep and rest:  Nap when your baby naps  Your baby may nap several times during the day  Get rest during this time  Limit visitors  Many people may want to see you and your baby  Ask friends or family to visit on different days  This will give you time to rest     Do not plan too much for one day  Put off household chores so that you have time to rest  Gradually do more each day  Ask for help from family, friends, or neighbors  Ask them to help you with laundry, cleaning, or errands  Also ask someone to watch the baby while you take a nap or relax  Ask your partner to help with the care of your baby  Pump some of your breast milk so your partner can feed your baby during the night  Exercise after delivery:  Wait until your healthcare provider says it is okay to exercise  Exercise can help you lose weight, increase your energy levels, and manage your mood  It can also prevent constipation and blood clots  Start with gentle exercises such as walking  Do more as you have more energy  You may need to avoid abdominal exercises for 1 to 2 weeks after you deliver  Talk to your healthcare provider about an exercise plan that is right for you  Sexual activity after delivery:   Do not have sex until your healthcare provider says it is okay  You may need to wait 4 to 6 weeks before you have sex  This may prevent infection and allow time to heal     Your menstrual cycle may begin as soon as 3 weeks after you deliver  Your period may be delayed if you breastfeed your baby  You can become pregnant before you get your first postpartum period  Talk to your healthcare provider about birth control that is right for you  Some types of birth control are not safe during breastfeeding      For support and more information:  Join a support group for new mothers  Ask for help from family and friends with chores, errands, and care of your baby  Office of Art Anderson,  Department of Health and [employer] Services  [address], [address] [facility]  [address]resa [address]ced[address]  [address], [address] [facility]  [address]  Phone: 0- [phone]  Web Address: www womenshealth gov  [facility] Postpartum [address] [facility] , [address] [facility]  [address] [facility] , [address] [place] [address]  Web Address: [employer]/pregnancy/[email or web address]-care  aspx  Follow up with your doctor or obstetrician as directed: You will need to follow up within 2 to 6 weeks of delivery  Write down your questions so you remember to ask them at your visits  © Copyright [employer] 2022 Information is for End User's use only and may not be sold, redistributed or otherwise used for commercial purposes  The above information is an  only  It is not intended as medical advice for individual conditions or treatments  Talk to your doctor, nurse or pharmacist before following any medical regimen to see if it is safe and effective for you  Preeclampsia and Eclampsia After Delivery   AMBULATORY CARE:   What you need to know about preeclampsia and eclampsia after delivery:  Preeclampsia is high blood pressure (BP) that usually develops after week 20 of pregnancy  It can also develop days to weeks after delivery, even if you did not have high BP during pregnancy  When it develops after delivery, it may also be called postpartum preeclampsia  Preeclampsia causes your BP to be 140/90 or higher  You may also have protein in your urine or damage to organs such as your kidneys or liver  Preeclampsia can lead to life-threatening conditions such as a stroke or HELLP syndrome (blood cell destruction)  It can also lead to eclampsia, a condition that causes seizures from high BP         Warning signs to watch for:   BP of 130/80 or higher    Shortness of breath    Nausea and vomiting, or severe stomach pain    Severe headaches    Vision changes, or seeing spots in front of your eyes    Arm or leg swelling    Call your local emergency number (911 in the [address]) if:   You have a seizure  You have chest pain  You have severe nausea and vomiting  Call your doctor or obstetrician if:   You develop a severe headache that does not go away  You have new or increased vision changes, such as blurred or spotted vision  You have new or increased swelling in your face or hands  You have severe abdominal pain with or without nausea and vomiting  You have shortness of breath  Your blood pressure is higher than you were told it should be  You have questions or concerns about your condition or care  Manage or prevent preeclampsia or eclampsia after delivery:   Go to follow-up appointments as directed  Your obstetrician will check your blood pressure regularly until it is normal  He or she may also order other tests  Take medicines as directed  Medicines may be given to lower your blood pressure, protect your organs, or prevent seizures  Rest during the day  Your healthcare provider may tell you to rest more often if you have mild symptoms of preeclampsia  Do not drink alcohol or smoke  Alcohol, nicotine, and other chemicals in cigarettes and cigars, can increase your BP  They can also harm your baby  Ask your healthcare provider for information if you currently drink alcohol or smoke and need help to quit  E-cigarettes or smokeless tobacco still contain nicotine  Talk to your healthcare provider before you use these products  Follow up with your doctor or obstetrician as directed:  Write down your questions so you remember to ask them during your visits    © Copyright Jaye Fuentes 2022 Information is for End User's use only and may not be sold, redistributed or otherwise used for commercial purposes  The above information is an  only  It is not intended as medical advice for individual conditions or treatments  Talk to your doctor, nurse or pharmacist before following any medical regimen to see if it is safe and effective for you

## 2023-03-29 NOTE — OP NOTE
OPERATIVE REPORT  PATIENT NAME: Jake Ha    :  1990  MRN: [id number]  Pt Location: [facility] L&D OR ROOM 01    SURGERY DATE: 3/28/2023    Surgeon(s) and Role:     * Latoya Bañuelos MD - Primary    Preop Diagnosis:  Fetal intolerance to labor, delivered, current hospitalization [O77 9]    Post-Op Diagnosis Codes:     * Fetal intolerance to labor, delivered, current hospitalization [O77 9]    Procedure(s) (LRB):   SECTION () (N/A)    Specimen(s):  ID Type Source Tests Collected by Time Destination   A :  Cord Blood Cord BLOOD GAS, VENOUS, CORD, BLOOD GAS, ARTERIAL, CORD Latoya Bañuelos MD 3/28/2023 2244    B :  Tissue (Placenta on Hold) OB Only Placenta PLACENTA IN STORAGE Caryn Chris Monteiro MD 3/28/2023 2244        Surgical QBL:  Pending    Drains:  Urethral Catheter 16 Fr  (Active)   Reasons to continue Urinary Catheter  Post-operative urological requirements 23   Goal for Removal Voiding trial when ambulation improves 23   Site Assessment Clean;Skin intact;Pink;Patent 23   Collection Container Standard drainage bag 23   Securement Method Securing device (Describe) 23   Output (mL) 200 mL 236   Number of days: 1       Anesthesia Type:   Epidural    Operative Indications:  Fetal intolerance to labor, delivered, current hospitalization [O77 9]     [facility] Classification System:  No Multiple pregnancy, No Transverse or oblique lie, No Breech lie, Gestational age is > or =37 weeks, Multiparous, No Previous uterine scar, Spontaneous Labor +  is [facility] 3    Operative Findings:  1  Viable male  at 3/28/23 with APGARs of 9 and 9 at 1 and 5 minutes  Fetal weight pending  2  Normal intact placenta with centrally inserted 3VC  3   Uterus with 10 cm x 7 cm area of endometriotic implants noted on posterior uterine wall  Normal bilateral tubes and ovaries    4  Blood gases:   Arterial pH: 7 169   Arterial base excess: -6 5   Venous pH: 7 325   Venous base excess: -4 6    The patient was taken to the operating room  Adequate epidural anesthesia was established and Ancef and azithromycin were given for preoperative prophylaxis  The patient was then placed in the dorsal supine position with a left tilt of the hips  Fetal heart tones were appreciated to be at baseline  The patient was then prepped with 4 hexedine for vaginal prep Syed was placed under sterile technique  The patient was prepped with chloraprep for abdominal prep and draped in the usual sterile fashion for a Pfannenstiel skin incision  A time out was performed to confirm correct patient and correct procedure  An incision was made in the skin with a surgical scalpel and sharp dissection was carried out over subsequent layers of tissue including the fascia  The fascia was incised at the midline and the fascial incision was extended bilaterally bluntly  The rectus muscles were then divided at midline and the peritoneum was identified, tented up at its upper margin taking care to avoid the bladder, and then entered  The peritoneal incision was extended superiorly and inferiorly  The bladder blade was inserted and a transverse incision was made in the lower uterine segment using a new surgical blade  The uterine incision was extended cephalad and caudal using blunt dissection  The amniotic sac was entered and the amniotic fluid was noted to be clear  The surgeon's hand was placed into the uterine cavity  The fetal head was identified and elevated through the uterine incision with the assistance of fundal pressure  With gentle traction, the shoulder was delivered, followed by the rest of the fetal body  There was no nuchal cord noted  On delivery the cord was doubly clamped and cut after delayed cord clamping  The infant was then passed off the table to the awaiting  staff   The  was noted to cry spontaneously and moved all extremities  Venous and arterial blood gas, cord blood, and portion of cord was obtained for analysis and routine blood testing  The Freddie-O ring retractor was inserted, and the placenta the placenta was delivered and then sent to storage  Placenta was noted to be intact with a centrally inserted three-vessel cord  Oxytocin was administered by IV infusion to enhance uterine contraction  The uterus was exteriorized and cleared of all clots and remaining products of conception  The uterine incision was re approximated using a 0 Vicryl in a running locked fashion  A second horizontal imbricating stitch with 0 Vicryl was applied  3 additional figure-of-8 sutures of 0 Vicryl were applied to the incision  The uterine incision was examined and noted to be hemostatic  The posterior cul-de-sac was cleared of all clots and products of conception  Endometriotic implants as above; Bovie electrocautery was used to establish hemostasis of several endometriotic implants  The uterus was replaced into the abdomen and the pericolic gutters were cleared of all clots  The uterine incision was once again reexamined and noted to be hemostatic  The fascia was re approximated using 0 Vicryl in a running nonlocked fashion  The subcutaneous tissue was irrigated and cleared of all clots and debris  Good hemostasis was noted with Bovie electrocautery  The subcutaneous tissue was reapproximated with 3 interrupted sutures of 3-0 plain gut  The skin incision was closed using 3-0 Monocryl with a Joseluis needle and Exofin was placed as sterile dressing  Good hemostasis was noted  Patient tolerated the procedure well  All needle, sponge, and instrument counts were noted to be correct x 2 at the end of the procedure  Patient was transferred to the recovery room in stable condition  Dr Eva Florence was present for the procedure        Complications:   None    I was present for the entire procedure    Patient Disposition:  PACU SIGNATURE: Grafton Goodell, MD  DATE: March 29, 2023  TIME: 12:05 AM

## 2023-03-29 NOTE — DISCHARGE SUMMARY
Discharge Summary - OB/GYN   Santos Hou 27 y o  female MRN: [id number]  Unit/Bed#: LD PACU-02 Encounter: [id number]      Admission Date: 3/28/2023     Discharge Date: 3/31/2023    Admitting Diagnosis:   1  Pregnancy at 40w0d  2  Thrombocytopenia affecting pregnancy  3  Elevated blood pressure without diagnosis of hypertension  4  Asthma  5  Thalassemia alpha carrier  6  Suspected fetal macrosomia    Discharge Diagnosis:   Same, delivered via  section for fetal intolerance to labor  Gestational hypertension    Procedures: primary  section, low transverse incision    Attending: Zachary Vincent MD    Hospital Course:     Santos Hou is a 27 y o  G 5 P  at 40w0d wks who was initially admitted for early labor  Her initial exam was 1 /-3, and her FHT demonstrated a baseline of 140, reactive  She was noted not to make change, and, since she was scheduled for induction later that day, she was given a Syed balloon and Pitocin for induction  She was counseled on risks of shoulder dystocia in her labor course  He received an epidural for pain control  She spontaneously ruptured for blood-tinged clear fluid  Her FHT was notable for intermittent variable decelerations, for which the patient received an IUPC  She was then noted to have recurrent variable decelerations had a prolonged deceleration, approximately 10 minutes, that resolved after repositioning, fluid administration, Pitocin continuation, and terbutaline administration  The  [address] recovered, however patient continued to have recurrent variable decelerations and her SVE remained unchanged  Recommendation was made to proceed to  section for fetal intolerance to labor; patient was agreeable with this plan  She delivered a viable male  on 3/28/2023 at 2242  Weight 7lbs 9 5oz via primary  section, low transverse incision  Apgars were 9 (1 min) and 9 (5 min)    was transferred to  nursery  Patient tolerated the procedure well and was transferred to recovery in stable condition  Her post-operative course was complicated by a diagnosis of gestational hypertension  She did not have any severe range blood pressures, and her PreE labs were within normal limits  Her platelets decreased from 118 at admission to 95 before discharge  This is likely due to gestational thrombocytopenia and blood loss from , not a severe feature of preeclampsia  Preoperative hemaglobin was 11 1, postoperative was 9 1 followed by 7 7  She received a dose of venofer  Her postoperative pain was well controlled with oral analgesics  On day of discharge, she was ambulating and able to reasonably perform all ADLs  She was voiding and had appropriate bowel function  Pain was well controlled  She was discharged home on post-operative day #3 without complications  Patient was instructed to follow up with her OB as an outpatient and was given appropriate warnings to call provider if she develops signs of infection or uncontrolled pain  Complications: none apparent    Condition at discharge: good     Discharge instructions/Information to patient and family:   See after visit summary for information provided to patient and family  Provisions for Follow-Up Care:  See after visit summary for information related to follow-up care and any pertinent home health orders  Disposition: See After Visit Summary for discharge disposition information  Planned Readmission: No    Discharge Medications: For a complete list of the patient's medications, please refer to her med rec

## 2023-03-29 NOTE — OB LABOR/OXYTOCIN SAFETY PROGRESS
Oxytocin Safety Progress Check Note - Lee Galvez 27 y o  female MRN: [id number]    Unit/Bed#: -01 Encounter: [id number]    Dose (brooks-units/min) Oxytocin: 4 brooks-units/min  Contraction Frequency (minutes): 2-5  Contraction Quality: Mild  Tachysystole: No   Cervical Dilation: 4-5        Cervical Effacement: 80  Fetal Station: -3  Baseline Rate: 125 bpm  Fetal Heart Rate: 120 BPM  FHR Category: Category I               Vital Signs:   Vitals:    03/28/23 2001   BP: 129/75   Pulse: 93   Resp:    Temp:    SpO2:        Notes/comments: patient having intermittent variable decelerations  SVE unchanged, IUPC placed  Continue to monitor  Start amnioinfusion if variables continue      D/w Dr Patricia Ramirez MD 3/28/2023 8:36 PM

## 2023-03-29 NOTE — PROGRESS NOTES
"[facility]  Progress Note  Name: Muna Medrano  MRN: [id number]  Unit/Bed#: -01 I Date of Admission: 3/28/2023   Date of Service: 3/29/2023 I Hospital Day: 1    Assessment/Plan    delivery, delivered, current hospitalization  Assessment & Plan  POD #1  Hgb preop  111 1->9 1 postop  Plan for oral iron  Ambulation as able  Zuñiga removed, pending void  Pain controlled  DVT prophylaxis: bilateral compression boots, lovenox      Gestational hypertension  Assessment & Plan  Systolic (80NTL), VEE:919 , Min:118 , CBI:421      Diastolic (65BNQ), GGT:25, Min:79, Max:84    Noted to have elevated pressure in triage on 3/27/23, met criteria with elevated Bps in labor  SRBPx1 during epidural  Plts 114 on admission  F/u CMP and PCr      Thrombocytopenia affecting pregnancy [facility])  Assessment & Plan  Platelets 550 2/86  Platelets 593 on admission  * 39 weeks gestation of pregnancy  Assessment & Plan  Admit to OBN   Clear liquid diet   F/u T&S, CBC, RPR   IVF LR 125cc/hr   Continuous fetal monitoring and tocometry   Analgesia at maternal request   Vertex by TAUS  Expectant management, cyto and FB following by pitocin titration if unchanged              Subjective/Objective   Chief Complaint: Feels well    Subjective: The patient is not having any pain  Eating a regular diet without difficulty  Flatus Yes   Bowel movements are not yet occurred  Voiding zuñiga removed, awaiting void difficulty  Ambulating No Breastfeeding Yes  Lochia normal     Zuñiga removed 10am, awaiting spontaneous void  BP stable    Objective:     Vitals: Blood pressure 101/52, pulse 85, temperature 98 2 °F (36 8 °C), temperature source Oral, resp  rate 20, height 5' 5\" (1 651 m), weight 91 2 kg (201 lb), last menstrual period 2022, SpO2 98 %, currently breastfeeding  ,Body mass index is 33 45 kg/m²        Intake/Output Summary (Last 24 hours) at 3/29/2023 1322  Last data filed at 3/29/2023 1030  Gross per 24 " hour   Intake 1100 ml   Output 2618 ml   Net -1518 ml       Invasive Devices     Peripheral Intravenous Line  Duration           Peripheral IV 03/28/23 Left;Dorsal (posterior) Forearm <1 day          Intrauterine Pressure Catheter  Duration           Intrauterine Pressure Catheter 03/28/23 2036 <1 day                Physical Exam:   Physical Exam   Constitutional: She appears well-developed and well-nourished  Cardiovascular: Normal rate, regular rhythm and normal heart sounds  Pulmonary/Chest: Effort normal and breath sounds normal    Abdominal: Soft  Bowel sounds are normal  She exhibits no distension  There is no tenderness  Lab, Imaging and other studies: I have personally reviewed pertinent reports

## 2023-03-29 NOTE — PLAN OF CARE
Problem: PAIN - ADULT  Goal: Verbalizes/displays adequate comfort level or baseline comfort level  Description: Interventions:  - Encourage patient to monitor pain and request assistance  - Assess pain using appropriate pain scale  - Administer analgesics based on type and severity of pain and evaluate response  - Implement non-pharmacological measures as appropriate and evaluate response  - Consider cultural and social influences on pain and pain management  - Notify physician/advanced practitioner if interventions unsuccessful or patient reports new pain  Outcome: Progressing     Problem: INFECTION - ADULT  Goal: Absence or prevention of progression during hospitalization  Description: INTERVENTIONS:  - Assess and monitor for signs and symptoms of infection  - Monitor lab/diagnostic results  - Monitor all insertion sites, i e  indwelling lines, tubes, and drains  - Monitor endotracheal if appropriate and nasal secretions for changes in amount and color  - [place] appropriate cooling/warming therapies per order  - Administer medications as ordered  - Instruct and encourage patient and family to use good hand hygiene technique  - Identify and instruct in appropriate isolation precautions for identified infection/condition  Outcome: Progressing  Goal: Absence of fever/infection during neutropenic period  Description: INTERVENTIONS:  - Monitor WBC    Outcome: Progressing     Problem: SAFETY ADULT  Goal: Patient will remain free of falls  Description: INTERVENTIONS:  - Educate patient/family on patient safety including physical limitations  - Instruct patient to call for assistance with activity   - Consult OT/PT to assist with strengthening/mobility   - Keep Call bell within reach  - Keep bed low and locked with side rails adjusted as appropriate  - Keep care items and personal belongings within reach  - Initiate and maintain comfort rounds  - Make Fall Risk Sign visible to staff  - Offer Toileting every  Hours, in advance of need  - Initiate/Maintain alarm  - Obtain necessary fall risk management equipment: pply yellow socks and bracelet for high fall risk patients  - Consider moving patient to room near nurses station  Outcome: Progressing     Problem: Knowledge Deficit  Goal: Patient/family/caregiver demonstrates understanding of disease process, treatment plan, medications, and discharge instructions  Description: Complete learning assessment and assess knowledge base    Interventions:  - Provide teaching at level of understanding  - Provide teaching via preferred learning methods  Outcome: Progressing     Problem: DISCHARGE PLANNING  Goal: Discharge to home or other facility with appropriate resources  Description: INTERVENTIONS:  - Identify barriers to discharge w/patient and caregiver  - Arrange for needed discharge resources and transportation as appropriate  - Identify discharge learning needs (meds, wound care, etc )  - Arrange for interpretive services to assist at discharge as needed  - Refer to Case Management Department for coordinating discharge planning if the patient needs post-hospital services based on physician/advanced practitioner order or complex needs related to functional status, cognitive ability, or social support system  Outcome: Progressing     Problem: POSTPARTUM  Goal: Experiences normal postpartum course  Description: INTERVENTIONS:  - Monitor maternal vital signs  - Assess uterine involution and lochia  Outcome: Progressing  Goal: Appropriate maternal -  bonding  Description: INTERVENTIONS:  - Identify family support  - Assess for appropriate maternal/infant bonding   -Encourage maternal/infant bonding opportunities  - Referral to  or  as needed  Outcome: Progressing  Goal: Establishment of infant feeding pattern  Description: INTERVENTIONS:  - Assess breast/bottle feeding  - Refer to lactation as needed  Outcome: Progressing  Goal: Incision(s), wounds(s) or drain site(s) healing without S/S of infection  Description: INTERVENTIONS  - Assess and document dressing, incision, wound bed, drain sites and surrounding tissue  - Provide patient and family education  - Perform skin care/dressing changes every   Outcome: Progressing

## 2023-03-29 NOTE — ASSESSMENT & PLAN NOTE
POD #1  Hgb preop  111 1->9 1 postop  Plan for oral iron  Ambulation as able  Syed removed, pending void  Pain controlled  DVT prophylaxis: bilateral compression boots, lovenox

## 2023-03-29 NOTE — PROGRESS NOTES
Decision for     To bedside as patient with recurrent variables  Exam unchanged from prior  During exam, prolonged deceleration  Discussed recommendation for  and patient agrees  Will proceed  Parties aware

## 2023-03-29 NOTE — PLAN OF CARE
Problem: PAIN - ADULT  Goal: Verbalizes/displays adequate comfort level or baseline comfort level  Description: Interventions:  - Encourage patient to monitor pain and request assistance  - Assess pain using appropriate pain scale  - Administer analgesics based on type and severity of pain and evaluate response  - Implement non-pharmacological measures as appropriate and evaluate response  - Consider cultural and social influences on pain and pain management  - Notify physician/advanced practitioner if interventions unsuccessful or patient reports new pain  3/29/2023 0234 by Ann Ross RN  Outcome: Progressing  3/29/2023 0234 by Ann Ross RN  Outcome: Progressing     Problem: INFECTION - ADULT  Goal: Absence or prevention of progression during hospitalization  Description: INTERVENTIONS:  - Assess and monitor for signs and symptoms of infection  - Monitor lab/diagnostic results  - Monitor all insertion sites, i e  indwelling lines, tubes, and drains  - Monitor endotracheal if appropriate and nasal secretions for changes in amount and color  - [place] appropriate cooling/warming therapies per order  - Administer medications as ordered  - Instruct and encourage patient and family to use good hand hygiene technique  - Identify and instruct in appropriate isolation precautions for identified infection/condition  3/29/2023 0234 by Ann Ross RN  Outcome: Progressing  3/29/2023 0234 by Ann Ross RN  Outcome: Progressing  Goal: Absence of fever/infection during neutropenic period  Description: INTERVENTIONS:  - Monitor WBC    3/29/2023 0234 by Ann Ross RN  Outcome: Progressing  3/29/2023 0234 by Ann Ross RN  Outcome: Progressing     Problem: SAFETY ADULT  Goal: Patient will remain free of falls  Description: INTERVENTIONS:  - Educate patient/family on patient safety including physical limitations  - Instruct patient to call for assistance with activity   - Consult OT/PT to assist with strengthening/mobility   - Keep Call bell within reach  - Keep bed low and locked with side rails adjusted as appropriate  - Keep care items and personal belongings within reach  - Initiate and maintain comfort rounds  - Make Fall Risk Sign visible to staff  - Offer Toileting every  Hours, in advance of need  - Initiate/Maintain alarm  - Obtain necessary fall risk management equipment:   - Apply yellow socks and bracelet for high fall risk patients  - Consider moving patient to room near nurses station  3/29/2023 0234 by Mary Ellen Smith RN  Outcome: Progressing  3/29/2023 0234 by Mary Ellen Smith RN  Outcome: Progressing     Problem: Knowledge Deficit  Goal: Patient/family/caregiver demonstrates understanding of disease process, treatment plan, medications, and discharge instructions  Description: Complete learning assessment and assess knowledge base    Interventions:  - Provide teaching at level of understanding  - Provide teaching via preferred learning methods  3/29/2023 0234 by Mary Ellen Smith RN  Outcome: Progressing  3/29/2023 0234 by Mary Ellen Smith RN  Outcome: Progressing     Problem: DISCHARGE PLANNING  Goal: Discharge to home or other facility with appropriate resources  Description: INTERVENTIONS:  - Identify barriers to discharge w/patient and caregiver  - Arrange for needed discharge resources and transportation as appropriate  - Identify discharge learning needs (meds, wound care, etc )  - Arrange for interpretive services to assist at discharge as needed  - Refer to Case Management Department for coordinating discharge planning if the patient needs post-hospital services based on physician/advanced practitioner order or complex needs related to functional status, cognitive ability, or social support system  3/29/2023 0234 by Mary Ellen Smith RN  Outcome: Progressing  3/29/2023 0234 by Mary Ellen Smith RN  Outcome: Progressing     Problem: POSTPARTUM  Goal: Experiences normal postpartum course  Description: INTERVENTIONS:  - Monitor maternal vital signs  - Assess uterine involution and lochia  Outcome: Progressing  Goal: Appropriate maternal -  bonding  Description: INTERVENTIONS:  - Identify family support  - Assess for appropriate maternal/infant bonding   -Encourage maternal/infant bonding opportunities  - Referral to  or  as needed  Outcome: Progressing  Goal: Establishment of infant feeding pattern  Description: INTERVENTIONS:  - Assess breast/bottle feeding  - Refer to lactation as needed  Outcome: Progressing  Goal: Incision(s), wounds(s) or drain site(s) healing without S/S of infection  Description: INTERVENTIONS  - Assess and document dressing, incision, wound bed, drain sites and surrounding tissue  - Provide patient and family education  - Perform skin care/dressing changes every  Outcome: Progressing

## 2023-03-30 LAB
ERYTHROCYTE [DISTWIDTH] IN BLOOD BY AUTOMATED COUNT: 16.5 % (ref 11.6–15.1)
HCT VFR BLD AUTO: 24.8 % (ref 34.8–46.1)
HGB BLD-MCNC: 7.7 G/DL (ref 11.5–15.4)
MCH RBC QN AUTO: 23.6 PG (ref 26.8–34.3)
MCHC RBC AUTO-ENTMCNC: 31 G/DL (ref 31.4–37.4)
MCV RBC AUTO: 76 FL (ref 82–98)
PLATELET # BLD AUTO: 95 THOUSANDS/UL (ref 149–390)
RBC # BLD AUTO: 3.26 MILLION/UL (ref 3.81–5.12)
WBC # BLD AUTO: 13.22 THOUSAND/UL (ref 4.31–10.16)

## 2023-03-30 RX ORDER — OXYCODONE HYDROCHLORIDE 10 MG/1
10 TABLET ORAL EVERY 4 HOURS PRN
Status: DISCONTINUED | OUTPATIENT
Start: 2023-03-30 | End: 2023-03-31 | Stop reason: HOSPADM

## 2023-03-30 RX ORDER — OXYCODONE HYDROCHLORIDE 5 MG/1
5 TABLET ORAL EVERY 4 HOURS PRN
Status: DISCONTINUED | OUTPATIENT
Start: 2023-03-30 | End: 2023-03-31 | Stop reason: HOSPADM

## 2023-03-30 RX ADMIN — FERROUS SULFATE TAB 325 MG (65 MG ELEMENTAL FE) 325 MG: 325 (65 FE) TAB at 08:25

## 2023-03-30 RX ADMIN — ENOXAPARIN SODIUM 40 MG: 40 INJECTION SUBCUTANEOUS at 08:25

## 2023-03-30 RX ADMIN — IBUPROFEN 600 MG: 600 TABLET, FILM COATED ORAL at 18:04

## 2023-03-30 RX ADMIN — SENNOSIDES 8.6 MG: 8.6 TABLET, FILM COATED ORAL at 08:25

## 2023-03-30 RX ADMIN — IBUPROFEN 600 MG: 600 TABLET, FILM COATED ORAL at 23:59

## 2023-03-30 RX ADMIN — IBUPROFEN 600 MG: 600 TABLET, FILM COATED ORAL at 06:20

## 2023-03-30 RX ADMIN — IRON SUCROSE 100 MG: 20 INJECTION, SOLUTION INTRAVENOUS at 08:25

## 2023-03-30 RX ADMIN — SIMETHICONE 80 MG: 80 TABLET, CHEWABLE ORAL at 00:27

## 2023-03-30 RX ADMIN — IBUPROFEN 600 MG: 600 TABLET, FILM COATED ORAL at 12:38

## 2023-03-30 RX ADMIN — SIMETHICONE 80 MG: 80 TABLET, CHEWABLE ORAL at 19:48

## 2023-03-30 RX ADMIN — DOCUSATE SODIUM 100 MG: 100 CAPSULE, LIQUID FILLED ORAL at 08:25

## 2023-03-30 RX ADMIN — ACETAMINOPHEN 650 MG: 325 TABLET ORAL at 02:23

## 2023-03-30 RX ADMIN — ACETAMINOPHEN 650 MG: 325 TABLET ORAL at 08:25

## 2023-03-30 RX ADMIN — ACETAMINOPHEN 650 MG: 325 TABLET ORAL at 19:48

## 2023-03-30 RX ADMIN — ACETAMINOPHEN 650 MG: 325 TABLET ORAL at 14:48

## 2023-03-30 NOTE — PLAN OF CARE
Problem: PAIN - ADULT  Goal: Verbalizes/displays adequate comfort level or baseline comfort level  Description: Interventions:  - Encourage patient to monitor pain and request assistance  - Assess pain using appropriate pain scale  - Administer analgesics based on type and severity of pain and evaluate response  - Implement non-pharmacological measures as appropriate and evaluate response  - Consider cultural and social influences on pain and pain management  - Notify physician/advanced practitioner if interventions unsuccessful or patient reports new pain  Outcome: Progressing     Problem: INFECTION - ADULT  Goal: Absence or prevention of progression during hospitalization  Description: INTERVENTIONS:  - Assess and monitor for signs and symptoms of infection  - Monitor lab/diagnostic results  - Monitor all insertion sites, i e  indwelling lines, tubes, and drains  - Monitor endotracheal if appropriate and nasal secretions for changes in amount and color  - [place] appropriate cooling/warming therapies per order  - Administer medications as ordered  - Instruct and encourage patient and family to use good hand hygiene technique  - Identify and instruct in appropriate isolation precautions for identified infection/condition  Outcome: Progressing  Goal: Absence of fever/infection during neutropenic period  Description: INTERVENTIONS:  - Monitor WBC    Outcome: Progressing     Problem: SAFETY ADULT  Goal: Patient will remain free of falls  Description: INTERVENTIONS:  - Educate patient/family on patient safety including physical limitations  - Instruct patient to call for assistance with activity   - Consult OT/PT to assist with strengthening/mobility   - Keep Call bell within reach  - Keep bed low and locked with side rails adjusted as appropriate  - Keep care items and personal belongings within reach  - Initiate and maintain comfort rounds  - Make Fall Risk Sign visible to staff  - Obtain necessary fall risk management equipment  - Apply yellow socks and bracelet for high fall risk patients  - Consider moving patient to room near nurses station  Outcome: Progressing     Problem: Knowledge Deficit  Goal: Patient/family/caregiver demonstrates understanding of disease process, treatment plan, medications, and discharge instructions  Description: Complete learning assessment and assess knowledge base    Interventions:  - Provide teaching at level of understanding  - Provide teaching via preferred learning methods  Outcome: Progressing     Problem: DISCHARGE PLANNING  Goal: Discharge to home or other facility with appropriate resources  Description: INTERVENTIONS:  - Identify barriers to discharge w/patient and caregiver  - Arrange for needed discharge resources and transportation as appropriate  - Identify discharge learning needs (meds, wound care, etc )  - Arrange for interpretive services to assist at discharge as needed  - Refer to Case Management Department for coordinating discharge planning if the patient needs post-hospital services based on physician/advanced practitioner order or complex needs related to functional status, cognitive ability, or social support system  Outcome: Progressing     Problem: POSTPARTUM  Goal: Experiences normal postpartum course  Description: INTERVENTIONS:  - Monitor maternal vital signs  - Assess uterine involution and lochia  Outcome: Progressing  Goal: Appropriate maternal -  bonding  Description: INTERVENTIONS:  - Identify family support  - Assess for appropriate maternal/infant bonding   -Encourage maternal/infant bonding opportunities  - Referral to  or  as needed  Outcome: Progressing  Goal: Establishment of infant feeding pattern  Description: INTERVENTIONS:  - Assess breast/bottle feeding  - Refer to lactation as needed  Outcome: Progressing  Goal: Incision(s), wounds(s) or drain site(s) healing without S/S of infection  Description: INTERVENTIONS  - Assess and document dressing, incision, wound bed, drain sites and surrounding tissue  - Provide patient and family education  - Perform skin care/dressing changes  Outcome: Progressing

## 2023-03-30 NOTE — ASSESSMENT & PLAN NOTE
Systolic (83NIR), IBW:082 , Min:108 , WAU:519      Diastolic (28QTN), BPE:00, Min:62, Max:77    Noted to have elevated pressure in triage on 3/27/23, met criteria with elevated Bps in labor  SRBPx1 during epidural  Plts 114 on admission   CMP wnl , plt 114 and PCr 0 18

## 2023-03-30 NOTE — ASSESSMENT & PLAN NOTE
, Hgb 11 1-> post op Hgb 9 1-> venofer ordered  > 7 7  Lines: zuñiga removed, passed void trial    Pain: Tylenol and motrin scheduled, chitra 5/10 PRN    FEN: Tolerating regular diet  DVT ppx: SCDs and  Lovenox 40mg qD  Passing flatus   Incision C/D/I

## 2023-03-30 NOTE — LACTATION NOTE
This note was copied from a baby's chart  Discharge Lactation: mom states baby is feeding well  Mom states baby is feeding well  Mom is mix feeding  Reviewed d/c    Enc  To call lactation    Mix Feeds:   Start every feeding at the breast  Offer both breasts or one breast and use breast compressions to achieve active suckling  Once baby is not actively suckling, bring baby in upright position and offer expressed milk and/or artificial supplementation via alternative feeding method (syringe, finger, paced bottle feeding)  Burp frequently between breasts and during paced bottle feeding  Once feed is complete, place baby back on breast for on-nutritive suck  Pump after the feeding session to supplement with expressed milk at next feed  Provided education on growth spurts, when to introduce bottles; paced bottle feeding, and non-nutritive suck at the breast  Provided education on Signs of satiation  Encouraged to call lactation to observe a latch prior to discharge for reassurance  Encouraged to call baby and me with any questions and closely monitor output

## 2023-03-30 NOTE — PLAN OF CARE
Problem: PAIN - ADULT  Goal: Verbalizes/displays adequate comfort level or baseline comfort level  Description: Interventions:  - Encourage patient to monitor pain and request assistance  - Assess pain using appropriate pain scale  - Administer analgesics based on type and severity of pain and evaluate response  - Implement non-pharmacological measures as appropriate and evaluate response  - Consider cultural and social influences on pain and pain management  - Notify physician/advanced practitioner if interventions unsuccessful or patient reports new pain  Outcome: Progressing     Problem: INFECTION - ADULT  Goal: Absence or prevention of progression during hospitalization  Description: INTERVENTIONS:  - Assess and monitor for signs and symptoms of infection  - Monitor lab/diagnostic results  - Monitor all insertion sites, i e  indwelling lines, tubes, and drains  - Monitor endotracheal if appropriate and nasal secretions for changes in amount and color  - [place] appropriate cooling/warming therapies per order  - Administer medications as ordered  - Instruct and encourage patient and family to use good hand hygiene technique  - Identify and instruct in appropriate isolation precautions for identified infection/condition  Outcome: Progressing  Goal: Absence of fever/infection during neutropenic period  Description: INTERVENTIONS:  - Monitor WBC    Outcome: Progressing     Problem: SAFETY ADULT  Goal: Patient will remain free of falls  Description: INTERVENTIONS:  - Educate patient/family on patient safety including physical limitations  - Instruct patient to call for assistance with activity   - Consult OT/PT to assist with strengthening/mobility   - Keep Call bell within reach  - Keep bed low and locked with side rails adjusted as appropriate  - Keep care items and personal belongings within reach  - Initiate and maintain comfort rounds  - Make Fall Risk Sign visible to staff  - Offer Toileting every  Hours, in advance of need  - Initiate/Maintain alarm  - Obtain necessary fall risk management equipme  - Apply yellow socks and bracelet for high fall risk patients  - Consider moving patient to room near nurses station  Outcome: Progressing     Problem: Knowledge Deficit  Goal: Patient/family/caregiver demonstrates understanding of disease process, treatment plan, medications, and discharge instructions  Description: Complete learning assessment and assess knowledge base    Interventions:  - Provide teaching at level of understanding  - Provide teaching via preferred learning methods  Outcome: Progressing     Problem: DISCHARGE PLANNING  Goal: Discharge to home or other facility with appropriate resources  Description: INTERVENTIONS:  - Identify barriers to discharge w/patient and caregiver  - Arrange for needed discharge resources and transportation as appropriate  - Identify discharge learning needs (meds, wound care, etc )  - Arrange for interpretive services to assist at discharge as needed  - Refer to Case Management Department for coordinating discharge planning if the patient needs post-hospital services based on physician/advanced practitioner order or complex needs related to functional status, cognitive ability, or social support system  Outcome: Progressing     Problem: POSTPARTUM  Goal: Experiences normal postpartum course  Description: INTERVENTIONS:  - Monitor maternal vital signs  - Assess uterine involution and lochia  Outcome: Progressing  Goal: Appropriate maternal -  bonding  Description: INTERVENTIONS:  - Identify family support  - Assess for appropriate maternal/infant bonding   -Encourage maternal/infant bonding opportunities  - Referral to  or  as needed  Outcome: Progressing  Goal: Establishment of infant feeding pattern  Description: INTERVENTIONS:  - Assess breast/bottle feeding  - Refer to lactation as needed  Outcome: Progressing  Goal: Incision(s), wounds(s) or drain site(s) healing without S/S of infection  Description: INTERVENTIONS  - Assess and document dressing, incision, wound bed, drain sites and surrounding tissue  - Provide patient and family education  - Perform skin care/dressing changes every   Outcome: Progressing

## 2023-03-30 NOTE — PROGRESS NOTES
Progress Note - OB/GYN  Shilpa Rogers 27 y o  female MRN: [id number]  Unit/Bed#:  322-01 Encounter: [id number]    Assessment and Plan     Shilpa Rogers is a patient of: [facility]  She is PPD# 2 s/p 1LTCS   Recovering well and is stable        delivery, delivered, current hospitalization  Assessment & Plan  , Hgb 11 1-> post op Hgb 9 1-> venofer ordered   Lines: zuñiga removed, passed void trial    Pain: Tylenol and toradol scheduled, chitra 5/10 PRN    FEN: Tolerating regular diet  DVT ppx: SCDs and  Lovenox 40mg qD  Passing flatus   Incision C/D/I         Thrombocytopenia affecting pregnancy ([address] )  Assessment & Plan  Platelets 825 3/75  Platelets 188 on admission--> 97 on 2023,repeat AM labs    Gestational hypertension  Assessment & Plan  Systolic (46AOQ), LXL:647 , Min:101 , GHP:481      Diastolic (84FZU), XFT:47, Min:52, Max:77    Noted to have elevated pressure in triage on 3/27/23, met criteria with elevated Bps in labor  SRBPx1 during epidural  Plts 114 on admission   CMP wnl , plt 114 and PCr 0 18      Thalassemia alpha carrier  Assessment & Plan  Father of baby negative    Asthma affecting pregnancy in third trimester  Assessment & Plan  Monitor symptoms        Disposition    - Anticipate discharge home on POD# 2-4      Subjective/Objective     Chief Complaint: Postpartum State     Subjective:    Shilpa Rogers is PPD/POD#2 s/p 1LTCS   This morning she reports shoulder pain primarily when she shifts to her right side  Pain is well controlled  Patient is currently voiding  She is ambulating  Patient is currently passing flatus and has had no bowel movement  She is tolerating PO, and denies nausea or vomitting  Patient denies headache, blurry vision fever, chills, chest pain, shortness of breath, or calf tenderness  She also denies RUQ pain   lochia is normal  She is  Breastfeeding  She is recovering well and is stable         Vitals:   /77 (BP Location: "Left arm)   Pulse 79   Temp 97 5 °F (36 4 °C) (Oral)   Resp 16   Ht 5' 5\" (1 651 m)   Wt 91 2 kg (201 lb)   LMP 06/22/2022   SpO2 98%   Breastfeeding Yes   BMI 33 45 kg/m²       Intake/Output Summary (Last 24 hours) at 3/30/2023 0827  Last data filed at 3/29/2023 1730  Gross per 24 hour   Intake --   Output 2100 ml   Net -2100 ml       Invasive Devices     Peripheral Intravenous Line  Duration           Peripheral IV 03/28/23 Left;Dorsal (posterior) Forearm 1 day          Intrauterine Pressure Catheter  Duration           Intrauterine Pressure Catheter 03/28/23 2036 1 day                Physical Exam:   GEN: Joyce Luo appears well, alert and oriented x 3, pleasant and cooperative   CARDIO: RRR, no murmurs or rubs  RESP:  CTAB, no wheezes or rales  ABDOMEN: soft, no tenderness, no distention, fundus @ 2 cm below u , Incision C/D/I  EXTREMITIES: non tender, no erythema      Labs:     Hemoglobin   Date Value Ref Range Status   03/29/2023 9 1 (L) 11 5 - 15 4 g/dL Final     Comment: This is an appended report  These results have been appended to a previously preliminary verified report    03/28/2023 11 1 (L) 11 5 - 15 4 g/dL Final     WBC   Date Value Ref Range Status   03/29/2023 13 89 (H) 4 31 - 10 16 Thousand/uL Final     Comment: This is an appended report    These results have been appended to a previously preliminary verified report    03/28/2023 10 89 (H) 4 31 - 10 16 Thousand/uL Final     Platelets   Date Value Ref Range Status   03/29/2023 97 (L) 149 - 390 Thousands/uL Final     Comment:     Results verified by repeat   03/28/2023 114 (L) 149 - 390 Thousands/uL Final     Creatinine   Date Value Ref Range Status   03/28/2023 0 52 (L) 0 60 - 1 30 mg/dL Final     Comment:     Standardized to IDMS reference method   03/27/2023 0 58 (L) 0 60 - 1 30 mg/dL Final     Comment:     Standardized to IDMS reference method     AST   Date Value Ref Range Status   03/28/2023 13 13 - 39 U/L Final " 03/27/2023 11 (L) 13 - 39 U/L Final     ALT   Date Value Ref Range Status   03/28/2023 8 7 - 52 U/L Final     Comment:     Specimen collection should occur prior to Sulfasalazine administration due to the potential for falsely depressed results  03/27/2023 8 7 - 52 U/L Final     Comment:     Specimen collection should occur prior to Sulfasalazine administration due to the potential for falsely depressed results             Savage Horan MD  3/30/2023  6:53 AM

## 2023-03-31 VITALS
OXYGEN SATURATION: 98 % | BODY MASS INDEX: 33.49 KG/M2 | RESPIRATION RATE: 20 BRPM | TEMPERATURE: 98.2 F | SYSTOLIC BLOOD PRESSURE: 113 MMHG | WEIGHT: 201 LBS | DIASTOLIC BLOOD PRESSURE: 58 MMHG | HEIGHT: 65 IN | HEART RATE: 88 BPM

## 2023-03-31 RX ORDER — DOCUSATE SODIUM 100 MG/1
100 CAPSULE, LIQUID FILLED ORAL 2 TIMES DAILY
Refills: 0
Start: 2023-03-31

## 2023-03-31 RX ORDER — OXYCODONE HYDROCHLORIDE 5 MG/1
5 TABLET ORAL EVERY 4 HOURS PRN
Qty: 5 TABLET | Refills: 0 | Status: SHIPPED | OUTPATIENT
Start: 2023-03-31 | End: 2023-04-10

## 2023-03-31 RX ORDER — ACETAMINOPHEN 325 MG/1
650 TABLET ORAL EVERY 6 HOURS SCHEDULED
Refills: 0
Start: 2023-03-31

## 2023-03-31 RX ORDER — IBUPROFEN 600 MG/1
600 TABLET ORAL EVERY 6 HOURS SCHEDULED
Qty: 30 TABLET | Refills: 0 | Status: SHIPPED | OUTPATIENT
Start: 2023-03-31 | End: 2023-04-30

## 2023-03-31 RX ADMIN — ACETAMINOPHEN 650 MG: 325 TABLET ORAL at 09:07

## 2023-03-31 RX ADMIN — ENOXAPARIN SODIUM 40 MG: 40 INJECTION SUBCUTANEOUS at 09:07

## 2023-03-31 RX ADMIN — IBUPROFEN 600 MG: 600 TABLET, FILM COATED ORAL at 12:32

## 2023-03-31 RX ADMIN — IBUPROFEN 600 MG: 600 TABLET, FILM COATED ORAL at 06:06

## 2023-03-31 RX ADMIN — FERROUS SULFATE TAB 325 MG (65 MG ELEMENTAL FE) 325 MG: 325 (65 FE) TAB at 09:07

## 2023-03-31 RX ADMIN — ACETAMINOPHEN 650 MG: 325 TABLET ORAL at 02:51

## 2023-03-31 RX ADMIN — DOCUSATE SODIUM 100 MG: 100 CAPSULE, LIQUID FILLED ORAL at 09:07

## 2023-03-31 NOTE — PROGRESS NOTES
Progress Note - OB/GYN  Laron Chang 27 y o  female MRN: [id number]  Unit/Bed#:  322-01 Encounter: [id number]    Assessment and Plan     Laron Chang is a patient of: [facility]  She is POD# 3 s/p 1LTCS  Recovering well and is stable        delivery, delivered, current hospitalization  Assessment & Plan  , Hgb 11 1-> post op Hgb 9 1-> venofer ordered  > 7 7  Lines: zuñiga removed, passed void trial    Pain: Tylenol and motrin scheduled, chitra 5/10 PRN    FEN: Tolerating regular diet  DVT ppx: SCDs and  Lovenox 40mg qD  Passing flatus   Incision C/D/I         Thrombocytopenia affecting pregnancy ([facility] [address] )  Assessment & Plan  Platelets 324   Platelets 623 on admission--> 97 on 2023,repeat AM labs -> stable 95    Gestational hypertension  Assessment & Plan  Systolic (96VJD), ZUE:908 , Min:108 , VWE:273      Diastolic (20ILA), OHX:70, Min:62, Max:77    Noted to have elevated pressure in triage on 3/27/23, met criteria with elevated Bps in labor  SRBPx1 during epidural  Plts 114 on admission   CMP wnl , plt 114 and PCr 0 18      Thalassemia alpha carrier  Assessment & Plan  Father of baby negative    Asthma affecting pregnancy in third trimester  Assessment & Plan  Monitor symptoms        Disposition    - Anticipate discharge home today      Subjective/Objective     Chief Complaint: Postoperative State     Subjective:    Laron Chang is s/p 1LTCS  She is POD# 3  She has no current complaints  Pain is well controlled  Patient is currently voiding  She is ambulating  Patient is currently passing flatus and has had bowel movement  She is tolerating PO, and denies nausea or vomitting  Patient denies fever, chills, chest pain, shortness of breath, or calf tenderness  Lochia is normal  She is  Breastfeeding  Her incision is C/D/I  She is recovering well and is stable         Vitals:   /62 (BP Location: Right arm)   Pulse 88   Temp 98 3 °F (36 8 °C) (Oral)   Resp 18 " Ht 5' 5\" (1 651 m)   Wt 91 2 kg (201 lb)   LMP 06/22/2022   SpO2 98%   Breastfeeding Yes   BMI 33 45 kg/m²     No intake or output data in the 24 hours ending 03/31/23 0622    Invasive Devices     Peripheral Intravenous Line  Duration           Peripheral IV 03/28/23 Left;Dorsal (posterior) Forearm 2 days          Intrauterine Pressure Catheter  Duration           Intrauterine Pressure Catheter 03/28/23 2036 2 days                Physical Exam:   GEN: Aggie Pop appears well, alert, pleasant and cooperative   CARDIO: RRR  RESP:  Normal respiratory effort  ABDOMEN: soft, no tenderness, no distention, fundus firm, Incision C/D/I  EXTREMITIES: SCDs on, Negative Edgar's sign bilaterally      Labs:     Hemoglobin   Date Value Ref Range Status   03/30/2023 7 7 (L) 11 5 - 15 4 g/dL Final   03/29/2023 9 1 (L) 11 5 - 15 4 g/dL Final     Comment: This is an appended report  These results have been appended to a previously preliminary verified report  WBC   Date Value Ref Range Status   03/30/2023 13 22 (H) 4 31 - 10 16 Thousand/uL Final   03/29/2023 13 89 (H) 4 31 - 10 16 Thousand/uL Final     Comment: This is an appended report  These results have been appended to a previously preliminary verified report       Platelets   Date Value Ref Range Status   03/30/2023 95 (L) 149 - 390 Thousands/uL Final     Comment:     Manual Review of Smear Performed   03/29/2023 97 (L) 149 - 390 Thousands/uL Final     Comment:     Results verified by repeat     Creatinine   Date Value Ref Range Status   03/28/2023 0 52 (L) 0 60 - 1 30 mg/dL Final     Comment:     Standardized to IDMS reference method   03/27/2023 0 58 (L) 0 60 - 1 30 mg/dL Final     Comment:     Standardized to IDMS reference method     AST   Date Value Ref Range Status   03/28/2023 13 13 - 39 U/L Final   03/27/2023 11 (L) 13 - 39 U/L Final     ALT   Date Value Ref Range Status   03/28/2023 8 7 - 52 U/L Final     Comment:     Specimen collection should " occur prior to Sulfasalazine administration due to the potential for falsely depressed results  2023 8 7 - 52 U/L Final     Comment:     Specimen collection should occur prior to Sulfasalazine administration due to the potential for falsely depressed results             Reinaldo Winn MD  OBGYN PGY1  3/31/2023  6:22 AM

## 2023-03-31 NOTE — PLAN OF CARE
Problem: PAIN - ADULT  Goal: Verbalizes/displays adequate comfort level or baseline comfort level  Description: Interventions:  - Encourage patient to monitor pain and request assistance  - Assess pain using appropriate pain scale  - Administer analgesics based on type and severity of pain and evaluate response  - Implement non-pharmacological measures as appropriate and evaluate response  - Consider cultural and social influences on pain and pain management  - Notify physician/advanced practitioner if interventions unsuccessful or patient reports new pain  Outcome: Progressing     Problem: INFECTION - ADULT  Goal: Absence or prevention of progression during hospitalization  Description: INTERVENTIONS:  - Assess and monitor for signs and symptoms of infection  - Monitor lab/diagnostic results  - Monitor all insertion sites, i e  indwelling lines, tubes, and drains  - Monitor endotracheal if appropriate and nasal secretions for changes in amount and color  - [place] appropriate cooling/warming therapies per order  - Administer medications as ordered  - Instruct and encourage patient and family to use good hand hygiene technique  - Identify and instruct in appropriate isolation precautions for identified infection/condition  Outcome: Progressing  Goal: Absence of fever/infection during neutropenic period  Description: INTERVENTIONS:  - Monitor WBC    Outcome: Progressing     Problem: SAFETY ADULT  Goal: Patient will remain free of falls  Description: INTERVENTIONS:  - Educate patient/family on patient safety including physical limitations  - Instruct patient to call for assistance with activity   - Consult OT/PT to assist with strengthening/mobility   - Keep Call bell within reach  - Keep bed low and locked with side rails adjusted as appropriate  - Keep care items and personal belongings within reach  - Initiate and maintain comfort rounds  - Make Fall Risk Sign visible to staff  - Obtain necessary fall risk management equipment  - Apply yellow socks and bracelet for high fall risk patients  - Consider moving patient to room near nurses station  Outcome: Progressing     Problem: Knowledge Deficit  Goal: Patient/family/caregiver demonstrates understanding of disease process, treatment plan, medications, and discharge instructions  Description: Complete learning assessment and assess knowledge base    Interventions:  - Provide teaching at level of understanding  - Provide teaching via preferred learning methods  Outcome: Progressing     Problem: DISCHARGE PLANNING  Goal: Discharge to home or other facility with appropriate resources  Description: INTERVENTIONS:  - Identify barriers to discharge w/patient and caregiver  - Arrange for needed discharge resources and transportation as appropriate  - Identify discharge learning needs (meds, wound care, etc )  - Arrange for interpretive services to assist at discharge as needed  - Refer to Case Management Department for coordinating discharge planning if the patient needs post-hospital services based on physician/advanced practitioner order or complex needs related to functional status, cognitive ability, or social support system  Outcome: Progressing     Problem: POSTPARTUM  Goal: Experiences normal postpartum course  Description: INTERVENTIONS:  - Monitor maternal vital signs  - Assess uterine involution and lochia  Outcome: Progressing  Goal: Appropriate maternal -  bonding  Description: INTERVENTIONS:  - Identify family support  - Assess for appropriate maternal/infant bonding   -Encourage maternal/infant bonding opportunities  - Referral to  or  as needed  Outcome: Progressing  Goal: Establishment of infant feeding pattern  Description: INTERVENTIONS:  - Assess breast/bottle feeding  - Refer to lactation as needed  Outcome: Progressing  Goal: Incision(s), wounds(s) or drain site(s) healing without S/S of infection  Description: INTERVENTIONS  - Assess and document dressing, incision, wound bed, drain sites and surrounding tissue  - Provide patient and family education  - Perform skin care/dressing changes  Outcome: Progressing

## 2023-03-31 NOTE — UTILIZATION REVIEW
NOTIFICATION OF ADMISSION DISCHARGE   This is a Notification of Discharge from [address]  Please be advised that this patient has been discharge from our facility  Below you will find the admission and discharge date and time including the patient’s disposition  UTILIZATION REVIEW CONTACT:  Lexi Figueroa  Utilization   Network Utilization Review Department  Phone: [phone] x carefully listen to the prompts  All voicemails are confidential   Email: [email or web address]     ADMISSION INFORMATION  PRESENTATION DATE: 3/28/2023 12:36 PM  OBERVATION ADMISSION DATE:   INPATIENT ADMISSION DATE: 3/28/23  1:50 PM   DISCHARGE DATE: 3/31/2023  2:05 PM   DISPOSITION:Home/Self Care    IMPORTANT INFORMATION:  Send all requests for admission clinical reviews, approved or denied determinations and any other requests to dedicated fax number below belonging to the campus where the patient is receiving treatment   List of dedicated fax numbers:  [address] DENIALS (Administrative/Medical Necessity) [phone]   [address] (Maternity/NICU/Pediatrics) [phone]   [facility] [phone]   [place] [address] [phone]   [address] [phone]   220 [facility] [phone]   [address] [place] [address] [phone]   [facility]6 [facility] 119 [phone]   [facility]  [phone]   4058 [facility] [phone]   412 [facility] 850 [address] [facility] [phone]

## 2023-04-02 ENCOUNTER — HOSPITAL ENCOUNTER (EMERGENCY)
Facility: HOSPITAL | Age: 33
Discharge: HOME | End: 2023-04-02
Attending: EMERGENCY MEDICINE

## 2023-04-02 VITALS
SYSTOLIC BLOOD PRESSURE: 144 MMHG | DIASTOLIC BLOOD PRESSURE: 75 MMHG | HEART RATE: 85 BPM | RESPIRATION RATE: 18 BRPM | TEMPERATURE: 97.5 F | OXYGEN SATURATION: 100 %

## 2023-04-02 DIAGNOSIS — I80.8 SUPERFICIAL THROMBOPHLEBITIS OF LEFT UPPER EXTREMITY: Primary | ICD-10-CM

## 2023-04-02 RX ORDER — CEPHALEXIN 250 MG/1
500 CAPSULE ORAL EVERY 6 HOURS SCHEDULED
Qty: 56 CAPSULE | Refills: 0 | Status: SHIPPED | OUTPATIENT
Start: 2023-04-02 | End: 2023-04-09

## 2023-04-02 RX ADMIN — DICLOFENAC SODIUM TOPICAL GEL, 1%, 2 G: 10 GEL TOPICAL at 15:41

## 2023-04-02 NOTE — ED PROVIDER NOTES
Pt Name: Dashawn Bartholomew  MRN: [id number]  [place] 1990  Age/Sex: 27 y o  female  Date of evaluation: 2023  PCP: No primary care provider on file  CHIEF COMPLAINT    Chief Complaint   Patient presents with   • Medical Problem     States she had an IV on left forearm couple of days ago and is c/o warm feeling and redness at the site  HPI    27 y o  female presenting with redness and swelling to the left forearm  Patient states she had an IV placed there for her delivery and  a few days ago, states she has felt some warmth, firmness, and redness there  She also complains of a mild pain to the area  Patient does note slight swelling around her  scar, request evaluation of same  She denies fevers, chest pain, shortness of breath, trauma, nausea, vomiting, other symptoms  HPI      Past Medical and Surgical History    Past Medical History:   Diagnosis Date   • Asthma        Past Surgical History:   Procedure Laterality Date   • LIPOSUCTION     • CA  DELIVERY ONLY N/A 3/28/2023    Procedure:  SECTION (); Surgeon: Stephanie Gay MD;  Location: [facility] ;  Service: Obstetrics       Family History   Problem Relation Age of Onset   • Breast cancer Sister    • Ovarian cancer Neg Hx    • Colon cancer Neg Hx        Social History     Tobacco Use   • Smoking status: Never   • Smokeless tobacco: Never   Vaping Use   • Vaping Use: Former   Substance Use Topics   • Alcohol use: Not Currently   • Drug use: Never           Allergies    No Known Allergies    Home Medications    Prior to Admission medications    Medication Sig Start Date End Date Taking?  Authorizing Provider   acetaminophen (TYLENOL) 325 mg tablet Take 2 tablets (650 mg total) by mouth every 6 (six) hours 3/31/23   Bree Mora MD   docusate sodium (COLACE) 100 mg capsule Take 1 capsule (100 mg total) by mouth 2 (two) times a day 3/31/23   Bree Mora MD   ibuprofen (MOTRIN) 600 mg tablet Take 1 tablet (600 mg total) by mouth every 6 (six) hours 3/31/23 4/30/23  Robles Portillo MD   oxyCODONE (ROXICODONE) 5 immediate release tablet Take 1 tablet (5 mg total) by mouth every 4 (four) hours as needed for severe pain for up to 10 days Max Daily Amount: 30 mg 3/31/23 4/10/23  Robles Portillo MD   Prenatal Vit-Fe Fumarate-FA (PRENATAL PO) Take by mouth    Historical Provider, MD           Review of Systems    Review of Systems   Constitutional: Negative for activity change, chills and fever  HENT: Negative for drooling and facial swelling  Eyes: Negative for pain, discharge and visual disturbance  Respiratory: Negative for apnea, cough, chest tightness, shortness of breath and wheezing  Cardiovascular: Negative for chest pain and leg swelling  Gastrointestinal: Negative for abdominal pain, constipation, diarrhea, nausea and vomiting  Genitourinary: Negative for difficulty urinating, dysuria and urgency  Musculoskeletal: Negative for arthralgias, back pain and gait problem  Skin: Positive for rash  Negative for color change  Neurological: Negative for dizziness, speech difficulty, weakness and headaches  Psychiatric/Behavioral: Negative for agitation, behavioral problems and confusion  All other systems reviewed and negative  Physical Exam      ED Triage Vitals [04/02/23 1449]   Temperature Pulse Respirations Blood Pressure SpO2   97 5 °F (36 4 °C) 85 18 144/75 100 %      Temp Source Heart Rate Source Patient Position - Orthostatic VS BP Location FiO2 (%)   Tympanic Monitor -- -- --      Pain Score       --               Physical Exam  Vitals and nursing note reviewed  Constitutional:       Appearance: She is well-developed  HENT:      Head: Normocephalic and atraumatic        Right Ear: External ear normal       Left Ear: External ear normal    Eyes:      Conjunctiva/sclera: Conjunctivae normal       Pupils: Pupils are equal, round, and reactive to light  Cardiovascular:      Rate and Rhythm: Normal rate and regular rhythm  Pulses: Normal pulses  Heart sounds: Normal heart sounds  No murmur heard  No friction rub  No gallop  Pulmonary:      Effort: Pulmonary effort is normal  No respiratory distress  Breath sounds: Normal breath sounds  No wheezing or rales  Abdominal:      General: There is no distension  Palpations: Abdomen is soft  Tenderness: There is no abdominal tenderness  There is no guarding or rebound  Comments:  surgical site examined, clean dry and intact without erythema, significant tenderness, fluctuance, or any signs of infection   Musculoskeletal:         General: Swelling and tenderness present  No deformity  Normal range of motion  Cervical back: Normal range of motion and neck supple  Comments: Swelling firmness and tenderness to palpation to a several centimeter area on the distal dorsal aspect of the left forearm  No pain out of proportion, no fluctuance, strength sensation pulse and cap refill intact distal   No swelling elsewhere in the left upper extremity proximal or distal, no tenderness to palpation over the deep venous system  Skin:     General: Skin is warm and dry  Capillary Refill: Capillary refill takes less than 2 seconds  Findings: No erythema or rash  Neurological:      Mental Status: She is alert and oriented to person, place, and time  Psychiatric:         Behavior: Behavior normal          Thought Content:  Thought content normal          Judgment: Judgment normal               Diagnostic Results      Labs:    Results Reviewed     None          All labs reviewed and utilized in the medical decision making process    Radiology:    No orders to display       All radiology studies independently viewed by me and interpreted by the radiologist     Procedure    Procedures        ED Course of Care and Re-Assessments      Patient given diclofenac sodium gel for symptomatic treatment of suspected superficial thrombophlebitis, marked with wound marker  Medications   Diclofenac Sodium (VOLTAREN) 1 % topical gel 2 g (2 g Topical Given 4/2/23 9489)           FINAL IMPRESSION    Final diagnoses:   Superficial thrombophlebitis of left upper extremity         DISPOSITION/PLAN    Presentation as above felt most consistent with superficial thrombophlebitis of the left upper arm at site of previous IV start  Vital signs reassuring, examination likewise reassuring  No signs symptoms or evidence of deep venous thrombosis or pulmonary embolism at this time  Surgical wound appears to be healing appropriately  Low suspicion for sepsis, necrotizing soft tissue infection, abscess at this time  Counseled regarding diagnosis as well as expected clinical course, discharged stricter precautions, given wait-and-see prescription for Keflex at patient's request should signs of infection appear  Time reflects when diagnosis was documented in both MDM as applicable and the Disposition within this note     Time User Action Codes Description Comment    4/2/2023  3:33 PM [place] Add [I80 8] Superficial thrombophlebitis of left upper extremity       ED Disposition     ED Disposition   Discharge    Condition   Stable    Date/Time   Sun Apr 2, 2023  3:33 PM    Doug Nuno discharge to home/self care                 Follow-up Information     Follow up With Specialties Details Why Contact Info Additional 2000 [facility] Emergency Department Emergency Medicine Go to  If symptoms worsen 34 [facility] [address] [facility] Emergency Department, [address] [place] [address], [address] , [address] [place] [address], Nurse Practitioner Call in 1 day To establish primary care and discuss this visit and schedule close outpatient follow-up [address], [address] Shyanne [address] 4918 [facility] [address]               PATIENT REFERRED TO:    5324 [facility] Emergency Department  [address] [facility] [address] [phone]  Go to   If symptoms worsen    Marylen JAMES Jarrell  [address], [address] A[address]isesar A[address] 4918 [facility] [address]  [phone]    Call in 1 day  To establish primary care and discuss this visit and schedule close outpatient follow-up      DISCHARGE MEDICATIONS:    Discharge Medication List as of 4/2/2023  3:36 PM      START taking these medications    Details   cephalexin (KEFLEX) 250 mg capsule Take 2 capsules (500 mg total) by mouth every 6 (six) hours for 7 days This is a wait-and-see prescription, fill this only if you experience spreading redness, increasing pain, fevers, or other concerning symptoms  , Starting Sun 4/2/2023, Until Sun 4/9/2 023, Print      Diclofenac Sodium (VOLTAREN) 1 % Apply 2 g topically 4 (four) times a day, Starting Sun 4/2/2023, Print         CONTINUE these medications which have NOT CHANGED    Details   acetaminophen (TYLENOL) 325 mg tablet Take 2 tablets (650 mg total) by mouth every 6 (six) hours, Starting Fri 3/31/2023, No Print      docusate sodium (COLACE) 100 mg capsule Take 1 capsule (100 mg total) by mouth 2 (two) times a day, Starting Fri 3/31/2023, No Print      ibuprofen (MOTRIN) 600 mg tablet Take 1 tablet (600 mg total) by mouth every 6 (six) hours, Starting Fri 3/31/2023, Until Sun 4/30/2023, Normal      oxyCODONE (ROXICODONE) 5 immediate release tablet Take 1 tablet (5 mg total) by mouth every 4 (four) hours as needed for severe pain for up to 10 days Max Daily Amount: 30 mg, Starting Fri 3/31/2023, Until Mon 4/10/2023 at 2359, Normal      Prenatal Vit-Fe Fumarate-FA (PRENATAL PO) Take by mouth, Historical Med             No discharge procedures on file           Ollie Veloz MD    Portions of the record may have "been created with voice recognition software  Occasional wrong word or \"sound alike\" substitutions may have occurred due to the inherent limitations of voice recognition software    Please read the chart carefully and recognize, using context, where substitutions have occurred     Moustapha Prater MD  04/02/23 0413    "

## 2023-04-04 LAB — PLACENTA IN STORAGE: NORMAL

## 2023-04-05 PROBLEM — D69.6 THROMBOCYTOPENIA AFFECTING PREGNANCY (HCC): Status: RESOLVED | Noted: 2023-03-28 | Resolved: 2023-04-05

## 2023-04-05 PROBLEM — Z3A.39 39 WEEKS GESTATION OF PREGNANCY: Status: RESOLVED | Noted: 2023-02-15 | Resolved: 2023-04-05

## 2023-04-05 PROBLEM — Z98.891 S/P CESAREAN SECTION: Status: ACTIVE | Noted: 2023-03-29

## 2023-04-05 PROBLEM — J45.909 ASTHMA AFFECTING PREGNANCY IN THIRD TRIMESTER: Status: RESOLVED | Noted: 2023-02-15 | Resolved: 2023-04-05

## 2023-04-05 PROBLEM — O99.513 ASTHMA AFFECTING PREGNANCY IN THIRD TRIMESTER: Status: RESOLVED | Noted: 2023-02-15 | Resolved: 2023-04-05

## 2023-04-05 PROBLEM — IMO0001 FETAL MACROSOMIA, THIRD TRIMESTER, NOT APPLICABLE OR UNSPECIFIED FETUS: Status: RESOLVED | Noted: 2023-02-27 | Resolved: 2023-04-05

## 2023-04-05 PROBLEM — O99.119 THROMBOCYTOPENIA AFFECTING PREGNANCY (HCC): Status: RESOLVED | Noted: 2023-03-28 | Resolved: 2023-04-05

## 2023-04-06 ENCOUNTER — APPOINTMENT (OUTPATIENT)
Age: 33
End: 2023-04-06

## 2023-04-06 DIAGNOSIS — O99.019 ANTEPARTUM ANEMIA: ICD-10-CM

## 2023-04-06 DIAGNOSIS — O13.9 GESTATIONAL HYPERTENSION, ANTEPARTUM: ICD-10-CM

## 2023-04-06 LAB
BASOPHILS # BLD AUTO: 0.05 THOUSANDS/ÂΜL (ref 0–0.1)
BASOPHILS NFR BLD AUTO: 1 % (ref 0–1)
EOSINOPHIL # BLD AUTO: 0.16 THOUSAND/ÂΜL (ref 0–0.61)
EOSINOPHIL NFR BLD AUTO: 2 % (ref 0–6)
ERYTHROCYTE [DISTWIDTH] IN BLOOD BY AUTOMATED COUNT: 17.3 % (ref 11.6–15.1)
HCT VFR BLD AUTO: 33.9 % (ref 34.8–46.1)
HGB BLD-MCNC: 10.1 G/DL (ref 11.5–15.4)
IMM GRANULOCYTES # BLD AUTO: 0.11 THOUSAND/UL (ref 0–0.2)
IMM GRANULOCYTES NFR BLD AUTO: 1 % (ref 0–2)
LYMPHOCYTES # BLD AUTO: 1.86 THOUSANDS/ÂΜL (ref 0.6–4.47)
LYMPHOCYTES NFR BLD AUTO: 23 % (ref 14–44)
MCH RBC QN AUTO: 23.1 PG (ref 26.8–34.3)
MCHC RBC AUTO-ENTMCNC: 29.8 G/DL (ref 31.4–37.4)
MCV RBC AUTO: 78 FL (ref 82–98)
MONOCYTES # BLD AUTO: 0.65 THOUSAND/ÂΜL (ref 0.17–1.22)
MONOCYTES NFR BLD AUTO: 8 % (ref 4–12)
NEUTROPHILS # BLD AUTO: 5.27 THOUSANDS/ÂΜL (ref 1.85–7.62)
NEUTS SEG NFR BLD AUTO: 65 % (ref 43–75)
NRBC BLD AUTO-RTO: 0 /100 WBCS
PLATELET # BLD AUTO: 265 THOUSANDS/UL (ref 149–390)
RBC # BLD AUTO: 4.37 MILLION/UL (ref 3.81–5.12)
WBC # BLD AUTO: 8.1 THOUSAND/UL (ref 4.31–10.16)

## 2023-04-07 ENCOUNTER — TELEPHONE (OUTPATIENT)
Dept: OBGYN CLINIC | Facility: MEDICAL CENTER | Age: 33
End: 2023-04-07

## 2023-04-07 LAB
ALBUMIN SERPL BCP-MCNC: 3.3 G/DL (ref 3.5–5)
ALP SERPL-CCNC: 143 U/L (ref 46–116)
ALT SERPL W P-5'-P-CCNC: 43 U/L (ref 12–78)
ANION GAP SERPL CALCULATED.3IONS-SCNC: 7 MMOL/L (ref 4–13)
AST SERPL W P-5'-P-CCNC: 18 U/L (ref 5–45)
BILIRUB SERPL-MCNC: 0.36 MG/DL (ref 0.2–1)
BUN SERPL-MCNC: 15 MG/DL (ref 5–25)
CALCIUM ALBUM COR SERPL-MCNC: 9.7 MG/DL (ref 8.3–10.1)
CALCIUM SERPL-MCNC: 9.1 MG/DL (ref 8.3–10.1)
CHLORIDE SERPL-SCNC: 111 MMOL/L (ref 96–108)
CO2 SERPL-SCNC: 23 MMOL/L (ref 21–32)
CREAT SERPL-MCNC: 0.75 MG/DL (ref 0.6–1.3)
GFR SERPL CREATININE-BSD FRML MDRD: 104 ML/MIN/1.73SQ M
GLUCOSE SERPL-MCNC: 78 MG/DL (ref 65–140)
POTASSIUM SERPL-SCNC: 3.6 MMOL/L (ref 3.5–5.3)
PROT SERPL-MCNC: 7.1 G/DL (ref 6.4–8.4)
SODIUM SERPL-SCNC: 141 MMOL/L (ref 135–147)

## 2023-04-07 NOTE — TELEPHONE ENCOUNTER
Spoke with patient informing her about her 's completed FMLA paperwork and that they can  at any office

## 2023-04-07 NOTE — TELEPHONE ENCOUNTER
----- Message from [email or web address] sent at 4/6/2023  2:38 PM EDT -----  Regarding: FMLA/DISABILTY FOR   PLEASE COMPLETE FMLA/DISABILITY FOR PT/S

## 2023-04-19 PROBLEM — Z98.891 S/P CESAREAN SECTION: Status: ACTIVE | Noted: 2023-03-28

## 2023-04-19 PROBLEM — O13.9 GESTATIONAL HYPERTENSION: Status: RESOLVED | Noted: 2023-03-28 | Resolved: 2023-04-19

## 2023-05-19 PROBLEM — Z98.891 S/P CESAREAN SECTION: Status: RESOLVED | Noted: 2023-03-28 | Resolved: 2023-05-19

## 2023-05-21 ENCOUNTER — TELEPHONE (OUTPATIENT)
Dept: OTHER | Facility: OTHER | Age: 33
End: 2023-05-21

## 2023-05-21 NOTE — TELEPHONE ENCOUNTER
Pt called wanting to reschedule her appointment regarding getting her tubes tied  Please call pt when office reopens

## (undated) DEVICE — Device

## (undated) DEVICE — GLOVE PI ULTRA TOUCH SZ.6.5

## (undated) DEVICE — LARGE, DISPOSABLE ALEXIS O C-SECTION PROTECTOR - RETRACTOR: Brand: ALEXIS ® O C-SECTION PROTECTOR - RETRACTOR

## (undated) DEVICE — TELFA NON-ADHERENT ABSORBENT DRESSING: Brand: TELFA

## (undated) DEVICE — GAUZE SPONGES,16 PLY: Brand: CURITY

## (undated) DEVICE — SUT VICRYL 0 CT-1 36 IN J946H

## (undated) DEVICE — SKIN MARKER DUAL TIP WITH RULER CAP, FLEXIBLE RULER AND LABELS: Brand: DEVON

## (undated) DEVICE — SUT VICRYL 0 CTX 36 IN J978H

## (undated) DEVICE — ABDOMINAL PAD: Brand: DERMACEA

## (undated) DEVICE — PACK C-SECTION PBDS

## (undated) DEVICE — CHLORAPREP HI-LITE 26ML ORANGE

## (undated) DEVICE — GLOVE INDICATOR PI UNDERGLOVE SZ 6.5 BLUE

## (undated) DEVICE — SUT MONOCRYL 3-0 UNDYED KS CS-1 Y523H

## (undated) DEVICE — MEDI-VAC YANKAUER SUCTION HANDLE W/STRAIGHT TIP & CONTROL VENT: Brand: CARDINAL HEALTH